# Patient Record
Sex: FEMALE | Race: WHITE | NOT HISPANIC OR LATINO | URBAN - METROPOLITAN AREA
[De-identification: names, ages, dates, MRNs, and addresses within clinical notes are randomized per-mention and may not be internally consistent; named-entity substitution may affect disease eponyms.]

---

## 2023-01-04 ENCOUNTER — APPOINTMENT (OUTPATIENT)
Dept: RADIOLOGY | Facility: IMAGING CENTER | Age: 68
End: 2023-01-04
Attending: STUDENT IN AN ORGANIZED HEALTH CARE EDUCATION/TRAINING PROGRAM
Payer: MEDICARE

## 2023-01-04 ENCOUNTER — OFFICE VISIT (OUTPATIENT)
Dept: URGENT CARE | Facility: CLINIC | Age: 68
End: 2023-01-04
Payer: MEDICARE

## 2023-01-04 VITALS
TEMPERATURE: 98.1 F | DIASTOLIC BLOOD PRESSURE: 72 MMHG | RESPIRATION RATE: 16 BRPM | HEIGHT: 60 IN | SYSTOLIC BLOOD PRESSURE: 100 MMHG | BODY MASS INDEX: 37.3 KG/M2 | OXYGEN SATURATION: 97 % | HEART RATE: 78 BPM | WEIGHT: 190 LBS

## 2023-01-04 DIAGNOSIS — L71.9 ROSACEA: ICD-10-CM

## 2023-01-04 DIAGNOSIS — R05.2 SUBACUTE COUGH: ICD-10-CM

## 2023-01-04 LAB
EXTERNAL QUALITY CONTROL: NORMAL
FLUAV+FLUBV AG SPEC QL IA: NORMAL
INT CON NEG: NORMAL
INT CON NEG: NORMAL
INT CON POS: NORMAL
INT CON POS: NORMAL
SARS-COV+SARS-COV-2 AG RESP QL IA.RAPID: NEGATIVE

## 2023-01-04 PROCEDURE — 71046 X-RAY EXAM CHEST 2 VIEWS: CPT | Mod: TC | Performed by: STUDENT IN AN ORGANIZED HEALTH CARE EDUCATION/TRAINING PROGRAM

## 2023-01-04 PROCEDURE — 99204 OFFICE O/P NEW MOD 45 MIN: CPT | Mod: CS | Performed by: STUDENT IN AN ORGANIZED HEALTH CARE EDUCATION/TRAINING PROGRAM

## 2023-01-04 PROCEDURE — 87426 SARSCOV CORONAVIRUS AG IA: CPT | Performed by: STUDENT IN AN ORGANIZED HEALTH CARE EDUCATION/TRAINING PROGRAM

## 2023-01-04 PROCEDURE — 87804 INFLUENZA ASSAY W/OPTIC: CPT | Performed by: STUDENT IN AN ORGANIZED HEALTH CARE EDUCATION/TRAINING PROGRAM

## 2023-01-04 RX ORDER — SERTRALINE HYDROCHLORIDE 100 MG/1
100 TABLET, FILM COATED ORAL DAILY
COMMUNITY

## 2023-01-04 RX ORDER — ROSUVASTATIN CALCIUM 10 MG/1
10 TABLET, COATED ORAL EVERY EVENING
COMMUNITY

## 2023-01-04 RX ORDER — TRAMADOL HYDROCHLORIDE 50 MG/1
50 TABLET ORAL EVERY 4 HOURS PRN
COMMUNITY
End: 2023-01-06

## 2023-01-04 RX ORDER — BRIMONIDINE 5 MG/G
1 GEL TOPICAL DAILY
Qty: 30 G | Refills: 0 | Status: SHIPPED | OUTPATIENT
Start: 2023-01-04 | End: 2023-01-05

## 2023-01-04 RX ORDER — LISINOPRIL 5 MG/1
5 TABLET ORAL DAILY
COMMUNITY

## 2023-01-04 RX ORDER — LEVOTHYROXINE SODIUM 75 UG/1
75 CAPSULE ORAL
COMMUNITY
End: 2023-01-06

## 2023-01-04 RX ORDER — CYCLOBENZAPRINE HCL 10 MG
10 TABLET ORAL 3 TIMES DAILY PRN
COMMUNITY
End: 2023-01-06

## 2023-01-04 RX ORDER — HYDROCODONE BITARTRATE AND ACETAMINOPHEN 5; 325 MG/1; MG/1
1 TABLET ORAL 2 TIMES DAILY PRN
COMMUNITY

## 2023-01-04 RX ORDER — METOPROLOL TARTRATE AND HYDROCHLOROTHIAZIDE 100; 25 MG/1; MG/1
1 TABLET ORAL DAILY
COMMUNITY
End: 2023-01-06

## 2023-01-04 ASSESSMENT — FIBROSIS 4 INDEX: FIB4 SCORE: 4.46

## 2023-01-04 NOTE — PROGRESS NOTES
"Subjective:   CHIEF COMPLAINT  Chief Complaint   Patient presents with    Cough     Cough, congestion, dizziness x1mo, worsened x2wk ago.       HPI  Trini Tirado is a 67 y.o. female who presents with a chief complaint of a \"nagging\" cough x2 months.  Says there are no specific triggers or aggravating factors.  She has tried using cough drops which have provided limited relief of symptoms.  Cough has been worse at night.  Positive ROS for fever with T-max of 97 degrees, chills and wheezing but no shortness of breath.  Also reports she has been experiencing swelling of her bilateral lower extremities but this is somewhat of a chronic issue.  Also reports has been experiencing a headache and dizziness.  She denies any history of asthma or tobacco abuse.  Says she has had bronchitis in the past and use inhalers.    Also patient complains of redness of her face.  She has a history of rosacea and says current symptoms are consistent with her previous outbreaks.  She has not tried any topical medications for the symptoms.  PMH of lupus.    REVIEW OF SYSTEMS  General: no fever or chills  GI: no nausea or vomiting  See HPI for further details.    PAST MEDICAL HISTORY  There are no problems to display for this patient.      SURGICAL HISTORY  patient denies any surgical history    ALLERGIES  Allergies   Allergen Reactions    Penicillins        CURRENT MEDICATIONS  cyclobenzaprine Tabs  Gabapentin Tabs  HYDROcodone-acetaminophen Tabs  Levothyroxine Sodium Caps  lisinopril Tabs  metoprolol-hydrochlorothiazide  rosuvastatin Tabs  sertraline Tabs  traMADol Tabs    SOCIAL HISTORY  Social History     Tobacco Use    Smoking status: Not on file    Smokeless tobacco: Not on file   Substance and Sexual Activity    Alcohol use: Not on file    Drug use: Not on file    Sexual activity: Not on file       FAMILY HISTORY  No family history on file.       Objective:   PHYSICAL EXAM  VITAL SIGNS: /72   Pulse 78   Temp 36.7 °C (98.1 °F) " (Temporal)   Resp 16   Ht 1.524 m (5')   Wt 86.2 kg (190 lb)   SpO2 97%   BMI 37.11 kg/m²     Gen: no acute distress, normal voice  Skin: dry, intact, moist mucosal membranes.  Mallar facial rash  Eyes: No conjunctival injection bilaterally.  Neck: Normal range of motion. No meningeal signs.   Lungs: CTAB w/ symmetric expansion.  1+ pitting edema bilateral lower extremities.  No calf TTP.  CV: RRR w/o murmurs or clicks  Psych: normal affect, normal judgement, alert, awake      RADIOLOGY RESULTS   DX-CHEST-2 VIEWS    Result Date: 1/4/2023 1/4/2023 3:50 PM HISTORY/REASON FOR EXAM:  Cough TECHNIQUE/EXAM DESCRIPTION AND NUMBER OF VIEWS: Two views of the chest. COMPARISON:  None available. FINDINGS: Cardiomediastinal contour is within normal limits. Mild blunting of RIGHT costophrenic angle. No focal pulmonary consolidation. No pneumothorax. Degenerative change of thoracic spine.     1.  Small RIGHT pleural effusion. 2.  No pneumonia or pneumothorax.             Assessment/Plan:     1. Subacute cough  DX-CHEST-2 VIEWS    PROCALCITONIN    POCT Influenza A/B    POCT SARS-COV Antigen JEOVANNY Manual Result      2. Rosacea  Brimonidine Tartrate 0.33 % Gel      1) chest x-ray demonstrated small right pleural effusion.  Patient was somewhat of a poor historian with regard to past medical history but on review of records it does appear that she does follow with cardiology and I suspect the effusion is likely related to heart failure but no evidence of any acute exacerbation.  Patient was very well-appearing, nontoxic and well-hydrated.  Oxygen saturation 97% and lungs were clear to auscultation bilaterally.  No indication for emergent work-up at this time.  I did order a procalcitonin to rule out underlying bacterial etiology.  Contact the patient with results at [phone] with results.  Okay to leave voicemail.  I did instruct the patient to schedule follow-up appointment with her cardiologist as soon as possible.   Discussed at length red flags and return/ED precautions.  Patient and daughter understood everything discussed today and all questions were answered.    2) chronic issue with acute exacerbation.  Patient is uncertain what medication she has used in the past so I sent a prescription for brimonidine tartrate to her pharmacy.  She was instructed to follow-up with primary care for reevaluation continue management.      Differential diagnosis, natural history, supportive care, and indications for immediate follow-up discussed. All questions answered. Patient agrees with the plan of care.    Follow-up as needed if symptoms worsen or fail to improve to PCP, Urgent care or Emergency Room.      47 minutes was spent on this encounter including reviewing past medical records, providing handout with the location and hours of [facility] for labs, face-to-face time, discussing the diagnosis, reviewing medical management, discussing the need for follow-up, and return/emergency room precautions and charting. This does not include time spent on separately billable procedures/tests.    Please note that this dictation was created using voice recognition software. I have made a reasonable attempt to correct obvious errors, but I expect that there are errors of grammar and possibly content that I did not discover before finalizing the note.

## 2023-01-05 ENCOUNTER — TELEPHONE (OUTPATIENT)
Dept: URGENT CARE | Facility: CLINIC | Age: 68
End: 2023-01-05
Payer: MEDICARE

## 2023-01-05 RX ORDER — METRONIDAZOLE 7.5 MG/G
1 GEL TOPICAL 2 TIMES DAILY
Qty: 45 G | Refills: 0 | Status: SHIPPED | OUTPATIENT
Start: 2023-01-05

## 2023-01-06 ENCOUNTER — APPOINTMENT (OUTPATIENT)
Dept: RADIOLOGY | Facility: MEDICAL CENTER | Age: 68
DRG: 872 | End: 2023-01-06
Attending: EMERGENCY MEDICINE
Payer: MEDICARE

## 2023-01-06 ENCOUNTER — HOSPITAL ENCOUNTER (INPATIENT)
Facility: MEDICAL CENTER | Age: 68
LOS: 2 days | DRG: 872 | End: 2023-01-08
Attending: EMERGENCY MEDICINE | Admitting: STUDENT IN AN ORGANIZED HEALTH CARE EDUCATION/TRAINING PROGRAM
Payer: MEDICARE

## 2023-01-06 DIAGNOSIS — H66.011 NON-RECURRENT ACUTE SUPPURATIVE OTITIS MEDIA OF RIGHT EAR WITH SPONTANEOUS RUPTURE OF TYMPANIC MEMBRANE: ICD-10-CM

## 2023-01-06 DIAGNOSIS — E87.20 NORMAL ANION GAP METABOLIC ACIDOSIS: ICD-10-CM

## 2023-01-06 DIAGNOSIS — H70.91 MASTOIDITIS OF RIGHT SIDE: ICD-10-CM

## 2023-01-06 DIAGNOSIS — H66.011 SPONTANEOUS RUPTURE OF TYMPANIC MEMBRANE OF RIGHT EAR CONCURRENT WITH AND DUE TO ACUTE SUPPURATIVE OTITIS MEDIA: ICD-10-CM

## 2023-01-06 DIAGNOSIS — B37.9 CANDIDIASIS: ICD-10-CM

## 2023-01-06 PROBLEM — D69.6 THROMBOCYTOPENIA (HCC): Status: ACTIVE | Noted: 2023-01-06

## 2023-01-06 PROBLEM — E87.6 HYPOKALEMIA: Status: ACTIVE | Noted: 2023-01-06

## 2023-01-06 PROBLEM — E66.01 CLASS 2 SEVERE OBESITY DUE TO EXCESS CALORIES WITH SERIOUS COMORBIDITY AND BODY MASS INDEX (BMI) OF 37.0 TO 37.9 IN ADULT (HCC): Status: ACTIVE | Noted: 2023-01-06

## 2023-01-06 PROBLEM — E11.9 TYPE II DIABETES MELLITUS (HCC): Status: ACTIVE | Noted: 2023-01-06

## 2023-01-06 PROBLEM — D64.9 NORMOCHROMIC NORMOCYTIC ANEMIA: Status: ACTIVE | Noted: 2023-01-06

## 2023-01-06 PROBLEM — A41.9 SEPSIS (HCC): Status: ACTIVE | Noted: 2023-01-06

## 2023-01-06 PROBLEM — R79.89 ELEVATED SERUM CREATININE: Status: ACTIVE | Noted: 2023-01-06

## 2023-01-06 PROBLEM — H70.009 ACUTE MASTOIDITIS: Status: ACTIVE | Noted: 2023-01-06

## 2023-01-06 LAB
ALBUMIN SERPL BCP-MCNC: 2.9 G/DL (ref 3.2–4.9)
ALBUMIN/GLOB SERPL: 0.9 G/DL
ALP SERPL-CCNC: 237 U/L (ref 30–99)
ALT SERPL-CCNC: 35 U/L (ref 2–50)
ANION GAP SERPL CALC-SCNC: 15 MMOL/L (ref 7–16)
APPEARANCE UR: CLEAR
AST SERPL-CCNC: 108 U/L (ref 12–45)
BACTERIA #/AREA URNS HPF: NEGATIVE /HPF
BASOPHILS # BLD AUTO: 0.7 % (ref 0–1.8)
BASOPHILS # BLD: 0.1 K/UL (ref 0–0.12)
BILIRUB SERPL-MCNC: 2.4 MG/DL (ref 0.1–1.5)
BILIRUB UR QL STRIP.AUTO: ABNORMAL
BUN SERPL-MCNC: 18 MG/DL (ref 8–22)
CALCIUM ALBUM COR SERPL-MCNC: 9.5 MG/DL (ref 8.5–10.5)
CALCIUM SERPL-MCNC: 8.6 MG/DL (ref 8.5–10.5)
CHLORIDE SERPL-SCNC: 102 MMOL/L (ref 96–112)
CO2 SERPL-SCNC: 19 MMOL/L (ref 20–33)
COLOR UR: ABNORMAL
CREAT SERPL-MCNC: 1.28 MG/DL (ref 0.5–1.4)
EOSINOPHIL # BLD AUTO: 0.02 K/UL (ref 0–0.51)
EOSINOPHIL NFR BLD: 0.1 % (ref 0–6.9)
EPI CELLS #/AREA URNS HPF: ABNORMAL /HPF
ERYTHROCYTE [DISTWIDTH] IN BLOOD BY AUTOMATED COUNT: 61.1 FL (ref 35.9–50)
FLUAV RNA SPEC QL NAA+PROBE: NEGATIVE
FLUBV RNA SPEC QL NAA+PROBE: NEGATIVE
GFR SERPLBLD CREATININE-BSD FMLA CKD-EPI: 46 ML/MIN/1.73 M 2
GLOBULIN SER CALC-MCNC: 3.2 G/DL (ref 1.9–3.5)
GLUCOSE BLD STRIP.AUTO-MCNC: 90 MG/DL (ref 65–99)
GLUCOSE BLD STRIP.AUTO-MCNC: 90 MG/DL (ref 65–99)
GLUCOSE SERPL-MCNC: 119 MG/DL (ref 65–99)
GLUCOSE UR STRIP.AUTO-MCNC: NEGATIVE MG/DL
HCT VFR BLD AUTO: 35.6 % (ref 37–47)
HGB BLD-MCNC: 11.7 G/DL (ref 12–16)
HYALINE CASTS #/AREA URNS LPF: ABNORMAL /LPF
IMM GRANULOCYTES # BLD AUTO: 0.14 K/UL (ref 0–0.11)
IMM GRANULOCYTES NFR BLD AUTO: 0.9 % (ref 0–0.9)
KETONES UR STRIP.AUTO-MCNC: ABNORMAL MG/DL
LACTATE SERPL-SCNC: 2.3 MMOL/L (ref 0.5–2)
LACTATE SERPL-SCNC: 2.9 MMOL/L (ref 0.5–2)
LACTATE SERPL-SCNC: 4.7 MMOL/L (ref 0.5–2)
LEUKOCYTE ESTERASE UR QL STRIP.AUTO: ABNORMAL
LYMPHOCYTES # BLD AUTO: 0.6 K/UL (ref 1–4.8)
LYMPHOCYTES NFR BLD: 4 % (ref 22–41)
MAGNESIUM SERPL-MCNC: 1.5 MG/DL (ref 1.5–2.5)
MCH RBC QN AUTO: 29.8 PG (ref 27–33)
MCHC RBC AUTO-ENTMCNC: 32.9 G/DL (ref 33.6–35)
MCV RBC AUTO: 90.8 FL (ref 81.4–97.8)
MICRO URNS: ABNORMAL
MONOCYTES # BLD AUTO: 1.23 K/UL (ref 0–0.85)
MONOCYTES NFR BLD AUTO: 8.1 % (ref 0–13.4)
NEUTROPHILS # BLD AUTO: 13.01 K/UL (ref 2–7.15)
NEUTROPHILS NFR BLD: 86.2 % (ref 44–72)
NITRITE UR QL STRIP.AUTO: POSITIVE
NRBC # BLD AUTO: 0 K/UL
NRBC BLD-RTO: 0 /100 WBC
PH UR STRIP.AUTO: 5.5 [PH] (ref 5–8)
PLATELET # BLD AUTO: 105 K/UL (ref 164–446)
PMV BLD AUTO: 10.8 FL (ref 9–12.9)
POTASSIUM SERPL-SCNC: 3.2 MMOL/L (ref 3.6–5.5)
PROT SERPL-MCNC: 6.1 G/DL (ref 6–8.2)
PROT UR QL STRIP: 30 MG/DL
RBC # BLD AUTO: 3.92 M/UL (ref 4.2–5.4)
RBC # URNS HPF: ABNORMAL /HPF
RBC UR QL AUTO: ABNORMAL
RSV RNA SPEC QL NAA+PROBE: NEGATIVE
SARS-COV-2 RNA RESP QL NAA+PROBE: NOTDETECTED
SODIUM SERPL-SCNC: 136 MMOL/L (ref 135–145)
SP GR UR STRIP.AUTO: 1.02
SPECIMEN SOURCE: NORMAL
UROBILINOGEN UR STRIP.AUTO-MCNC: 2 MG/DL
WBC # BLD AUTO: 15.1 K/UL (ref 4.8–10.8)
WBC #/AREA URNS HPF: ABNORMAL /HPF

## 2023-01-06 PROCEDURE — 36415 COLL VENOUS BLD VENIPUNCTURE: CPT

## 2023-01-06 PROCEDURE — 83735 ASSAY OF MAGNESIUM: CPT

## 2023-01-06 PROCEDURE — C9803 HOPD COVID-19 SPEC COLLECT: HCPCS | Performed by: EMERGENCY MEDICINE

## 2023-01-06 PROCEDURE — A9270 NON-COVERED ITEM OR SERVICE: HCPCS | Performed by: EMERGENCY MEDICINE

## 2023-01-06 PROCEDURE — 770001 HCHG ROOM/CARE - MED/SURG/GYN PRIV*

## 2023-01-06 PROCEDURE — 700105 HCHG RX REV CODE 258: Performed by: STUDENT IN AN ORGANIZED HEALTH CARE EDUCATION/TRAINING PROGRAM

## 2023-01-06 PROCEDURE — 96375 TX/PRO/DX INJ NEW DRUG ADDON: CPT

## 2023-01-06 PROCEDURE — 71045 X-RAY EXAM CHEST 1 VIEW: CPT

## 2023-01-06 PROCEDURE — 83605 ASSAY OF LACTIC ACID: CPT

## 2023-01-06 PROCEDURE — 700105 HCHG RX REV CODE 258: Performed by: EMERGENCY MEDICINE

## 2023-01-06 PROCEDURE — 99222 1ST HOSP IP/OBS MODERATE 55: CPT | Mod: AI | Performed by: STUDENT IN AN ORGANIZED HEALTH CARE EDUCATION/TRAINING PROGRAM

## 2023-01-06 PROCEDURE — A9270 NON-COVERED ITEM OR SERVICE: HCPCS | Performed by: STUDENT IN AN ORGANIZED HEALTH CARE EDUCATION/TRAINING PROGRAM

## 2023-01-06 PROCEDURE — 700111 HCHG RX REV CODE 636 W/ 250 OVERRIDE (IP): Performed by: EMERGENCY MEDICINE

## 2023-01-06 PROCEDURE — 74176 CT ABD & PELVIS W/O CONTRAST: CPT

## 2023-01-06 PROCEDURE — 87070 CULTURE OTHR SPECIMN AEROBIC: CPT

## 2023-01-06 PROCEDURE — 87205 SMEAR GRAM STAIN: CPT

## 2023-01-06 PROCEDURE — 700102 HCHG RX REV CODE 250 W/ 637 OVERRIDE(OP): Performed by: EMERGENCY MEDICINE

## 2023-01-06 PROCEDURE — 96365 THER/PROPH/DIAG IV INF INIT: CPT

## 2023-01-06 PROCEDURE — 81001 URINALYSIS AUTO W/SCOPE: CPT

## 2023-01-06 PROCEDURE — 700111 HCHG RX REV CODE 636 W/ 250 OVERRIDE (IP): Performed by: STUDENT IN AN ORGANIZED HEALTH CARE EDUCATION/TRAINING PROGRAM

## 2023-01-06 PROCEDURE — 76705 ECHO EXAM OF ABDOMEN: CPT

## 2023-01-06 PROCEDURE — 80053 COMPREHEN METABOLIC PANEL: CPT

## 2023-01-06 PROCEDURE — 99285 EMERGENCY DEPT VISIT HI MDM: CPT

## 2023-01-06 PROCEDURE — 87077 CULTURE AEROBIC IDENTIFY: CPT

## 2023-01-06 PROCEDURE — 700102 HCHG RX REV CODE 250 W/ 637 OVERRIDE(OP): Performed by: STUDENT IN AN ORGANIZED HEALTH CARE EDUCATION/TRAINING PROGRAM

## 2023-01-06 PROCEDURE — 87040 BLOOD CULTURE FOR BACTERIA: CPT

## 2023-01-06 PROCEDURE — 700101 HCHG RX REV CODE 250: Performed by: OTOLARYNGOLOGY

## 2023-01-06 PROCEDURE — 82962 GLUCOSE BLOOD TEST: CPT

## 2023-01-06 PROCEDURE — 0241U HCHG SARS-COV-2 COVID-19 NFCT DS RESP RNA 4 TRGT MIC: CPT

## 2023-01-06 PROCEDURE — 70450 CT HEAD/BRAIN W/O DYE: CPT

## 2023-01-06 PROCEDURE — 85025 COMPLETE CBC W/AUTO DIFF WBC: CPT

## 2023-01-06 RX ORDER — POLYETHYLENE GLYCOL 3350 17 G/17G
1 POWDER, FOR SOLUTION ORAL
Status: DISCONTINUED | OUTPATIENT
Start: 2023-01-06 | End: 2023-01-08 | Stop reason: HOSPADM

## 2023-01-06 RX ORDER — METOPROLOL SUCCINATE 100 MG/1
100 TABLET, EXTENDED RELEASE ORAL DAILY
COMMUNITY

## 2023-01-06 RX ORDER — GABAPENTIN 100 MG/1
100 CAPSULE ORAL 2 TIMES DAILY PRN
COMMUNITY

## 2023-01-06 RX ORDER — AMOXICILLIN 250 MG
2 CAPSULE ORAL 2 TIMES DAILY
Status: DISCONTINUED | OUTPATIENT
Start: 2023-01-06 | End: 2023-01-08 | Stop reason: HOSPADM

## 2023-01-06 RX ORDER — ONDANSETRON 2 MG/ML
4 INJECTION INTRAMUSCULAR; INTRAVENOUS ONCE
Status: COMPLETED | OUTPATIENT
Start: 2023-01-06 | End: 2023-01-06

## 2023-01-06 RX ORDER — HEPARIN SODIUM 5000 [USP'U]/ML
5000 INJECTION, SOLUTION INTRAVENOUS; SUBCUTANEOUS EVERY 8 HOURS
Status: DISCONTINUED | OUTPATIENT
Start: 2023-01-06 | End: 2023-01-08 | Stop reason: HOSPADM

## 2023-01-06 RX ORDER — ACETAMINOPHEN 325 MG/1
650 TABLET ORAL EVERY 6 HOURS PRN
Status: DISCONTINUED | OUTPATIENT
Start: 2023-01-06 | End: 2023-01-07

## 2023-01-06 RX ORDER — CIPROFLOXACIN AND DEXAMETHASONE 3; 1 MG/ML; MG/ML
4 SUSPENSION/ DROPS AURICULAR (OTIC) 2 TIMES DAILY
Status: DISCONTINUED | OUTPATIENT
Start: 2023-01-06 | End: 2023-01-08

## 2023-01-06 RX ORDER — ONDANSETRON 2 MG/ML
4 INJECTION INTRAMUSCULAR; INTRAVENOUS EVERY 4 HOURS PRN
Status: DISCONTINUED | OUTPATIENT
Start: 2023-01-06 | End: 2023-01-08 | Stop reason: HOSPADM

## 2023-01-06 RX ORDER — ONDANSETRON 4 MG/1
4 TABLET, ORALLY DISINTEGRATING ORAL EVERY 4 HOURS PRN
Status: DISCONTINUED | OUTPATIENT
Start: 2023-01-06 | End: 2023-01-08 | Stop reason: HOSPADM

## 2023-01-06 RX ORDER — LABETALOL HYDROCHLORIDE 5 MG/ML
10 INJECTION, SOLUTION INTRAVENOUS EVERY 4 HOURS PRN
Status: DISCONTINUED | OUTPATIENT
Start: 2023-01-06 | End: 2023-01-08 | Stop reason: HOSPADM

## 2023-01-06 RX ORDER — POTASSIUM CHLORIDE 20 MEQ/1
40 TABLET, EXTENDED RELEASE ORAL ONCE
Status: COMPLETED | OUTPATIENT
Start: 2023-01-06 | End: 2023-01-06

## 2023-01-06 RX ORDER — LEVOTHYROXINE SODIUM 0.07 MG/1
75 TABLET ORAL
COMMUNITY

## 2023-01-06 RX ORDER — IBUPROFEN 400 MG/1
400 TABLET ORAL ONCE
Status: COMPLETED | OUTPATIENT
Start: 2023-01-07 | End: 2023-01-07

## 2023-01-06 RX ORDER — SODIUM CHLORIDE, SODIUM LACTATE, POTASSIUM CHLORIDE, CALCIUM CHLORIDE 600; 310; 30; 20 MG/100ML; MG/100ML; MG/100ML; MG/100ML
INJECTION, SOLUTION INTRAVENOUS CONTINUOUS
Status: DISCONTINUED | OUTPATIENT
Start: 2023-01-06 | End: 2023-01-07

## 2023-01-06 RX ORDER — MORPHINE SULFATE 4 MG/ML
4 INJECTION INTRAVENOUS ONCE
Status: COMPLETED | OUTPATIENT
Start: 2023-01-06 | End: 2023-01-06

## 2023-01-06 RX ORDER — CEFTRIAXONE 1 G/1
1000 INJECTION, POWDER, FOR SOLUTION INTRAMUSCULAR; INTRAVENOUS ONCE
Status: COMPLETED | OUTPATIENT
Start: 2023-01-06 | End: 2023-01-06

## 2023-01-06 RX ORDER — SODIUM CHLORIDE, SODIUM LACTATE, POTASSIUM CHLORIDE, CALCIUM CHLORIDE 600; 310; 30; 20 MG/100ML; MG/100ML; MG/100ML; MG/100ML
1000 INJECTION, SOLUTION INTRAVENOUS ONCE
Status: COMPLETED | OUTPATIENT
Start: 2023-01-06 | End: 2023-01-06

## 2023-01-06 RX ORDER — BISACODYL 10 MG
10 SUPPOSITORY, RECTAL RECTAL
Status: DISCONTINUED | OUTPATIENT
Start: 2023-01-06 | End: 2023-01-08 | Stop reason: HOSPADM

## 2023-01-06 RX ADMIN — CEFTRIAXONE SODIUM 1000 MG: 1 INJECTION, POWDER, FOR SOLUTION INTRAMUSCULAR; INTRAVENOUS at 11:38

## 2023-01-06 RX ADMIN — SODIUM CHLORIDE, POTASSIUM CHLORIDE, SODIUM LACTATE AND CALCIUM CHLORIDE: 600; 310; 30; 20 INJECTION, SOLUTION INTRAVENOUS at 15:59

## 2023-01-06 RX ADMIN — ACETAMINOPHEN 650 MG: 325 TABLET ORAL at 18:38

## 2023-01-06 RX ADMIN — ONDANSETRON 4 MG: 2 INJECTION INTRAMUSCULAR; INTRAVENOUS at 12:40

## 2023-01-06 RX ADMIN — POTASSIUM CHLORIDE 40 MEQ: 1500 TABLET, EXTENDED RELEASE ORAL at 14:01

## 2023-01-06 RX ADMIN — CEFEPIME 2 G: 2 INJECTION, POWDER, FOR SOLUTION INTRAVENOUS at 15:04

## 2023-01-06 RX ADMIN — POTASSIUM CHLORIDE 40 MEQ: 1500 TABLET, EXTENDED RELEASE ORAL at 18:17

## 2023-01-06 RX ADMIN — CIPROFLOXACIN AND DEXAMETHASONE 4 DROP: 3; 1 SUSPENSION/ DROPS AURICULAR (OTIC) at 18:17

## 2023-01-06 RX ADMIN — SODIUM CHLORIDE, POTASSIUM CHLORIDE, SODIUM LACTATE AND CALCIUM CHLORIDE 1000 ML: 600; 310; 30; 20 INJECTION, SOLUTION INTRAVENOUS at 12:39

## 2023-01-06 RX ADMIN — MORPHINE SULFATE 4 MG: 4 INJECTION INTRAVENOUS at 12:40

## 2023-01-06 RX ADMIN — DOCUSATE SODIUM 50 MG AND SENNOSIDES 8.6 MG 2 TABLET: 8.6; 5 TABLET, FILM COATED ORAL at 18:17

## 2023-01-06 RX ADMIN — HEPARIN SODIUM 5000 UNITS: 5000 INJECTION, SOLUTION INTRAVENOUS; SUBCUTANEOUS at 18:15

## 2023-01-06 ASSESSMENT — LIFESTYLE VARIABLES
TOTAL SCORE: 0
HAVE YOU EVER FELT YOU SHOULD CUT DOWN ON YOUR DRINKING: NO
ALCOHOL_USE: NO
CONSUMPTION TOTAL: NEGATIVE
HOW MANY TIMES IN THE PAST YEAR HAVE YOU HAD 5 OR MORE DRINKS IN A DAY: 0
EVER FELT BAD OR GUILTY ABOUT YOUR DRINKING: NO
HAVE PEOPLE ANNOYED YOU BY CRITICIZING YOUR DRINKING: NO
TOTAL SCORE: 0
AVERAGE NUMBER OF DAYS PER WEEK YOU HAVE A DRINK CONTAINING ALCOHOL: 0
EVER HAD A DRINK FIRST THING IN THE MORNING TO STEADY YOUR NERVES TO GET RID OF A HANGOVER: NO
ON A TYPICAL DAY WHEN YOU DRINK ALCOHOL HOW MANY DRINKS DO YOU HAVE: 0
TOTAL SCORE: 0

## 2023-01-06 ASSESSMENT — FIBROSIS 4 INDEX: FIB4 SCORE: 4.46

## 2023-01-06 ASSESSMENT — PATIENT HEALTH QUESTIONNAIRE - PHQ9
1. LITTLE INTEREST OR PLEASURE IN DOING THINGS: NOT AT ALL
2. FEELING DOWN, DEPRESSED, IRRITABLE, OR HOPELESS: NOT AT ALL
SUM OF ALL RESPONSES TO PHQ9 QUESTIONS 1 AND 2: 0

## 2023-01-06 ASSESSMENT — ENCOUNTER SYMPTOMS
CHILLS: 0
FEVER: 0
BLURRED VISION: 1

## 2023-01-06 NOTE — H&P
Hospital Medicine History & Physical Note    Date of Service  1/6/2023    Primary Care Physician  Pcp Pt States None    Consultants  ENT    Specialist Names: Dr Chevalier    Code Status  Full Code    Chief Complaint  Chief Complaint   Patient presents with    Earache     Right earache, believes the eardrum ruptured last night. Sinus pressure x 1 month    Flu Like Symptoms     Seen at [facility] for same, told she had fluid in her lungs and that it was probably bacterial. Sent for blood work. Not prescribed antibiotics. Prescribed a face cream for facial redness. Unable to fill because medication cost $300.     Leg Swelling     Saw cardiologist Dr. Andrea In November.     Fall     Falls x the last year, using a walker. Referral to neurology from November.        History of Presenting Illness  Trini Tirado is a 67 y.o. female who presented 1/6/2023 with a ear pain.  Patient reports that she has been having headaches and sinus pressure along with dizziness and the sensation of spinning.  She reports her symptoms started last week with ear pain on the right side and decreased hearing.  She reports that she noticed drainage starting yesterday of the right ear.  Denies any problems with her left ear.  She denies fevers, chills, shortness of breath, chest pain, nausea, vomiting.  In the ED CT head demonstrated fluid signal within the mastoid without erosion or subperiosteal abscess.  ENT was consulted.    I discussed the plan of care with patient.    Review of Systems  Review of Systems   Constitutional:  Positive for malaise/fatigue. Negative for chills and fever.   HENT:  Positive for ear pain and hearing loss.    Eyes:  Positive for blurred vision.   All other systems reviewed and are negative.    Past Medical History   has no past medical history on file.    Surgical History   has no past surgical history on file.     Family History     Family history reviewed with patient. There is no family history that is pertinent  to the chief complaint.     Social History   reports that she has never smoked. She has never used smokeless tobacco. She reports that she does not currently use alcohol. She reports that she does not use drugs.    Allergies  Allergies   Allergen Reactions    Penicillins Rash     Rash       Medications  Prior to Admission Medications   Prescriptions Last Dose Informant Patient Reported? Taking?   HYDROcodone-acetaminophen (NORCO) 5-325 MG Tab per tablet 1/6/2023 at 0600 Patient Yes No   Sig: Take 1 Tablet by mouth 2 times a day as needed (Pain).   gabapentin (NEURONTIN) 100 MG Cap 4-5 DAYS AGO at [facility] Patient Yes Yes   Sig: Take 100 mg by mouth 2 times a day as needed (Leg spasms).   levothyroxine (SYNTHROID) 75 MCG Tab 1/3/2023 at [facility] Patient Yes Yes   Sig: Take 75 mcg by mouth every morning on an empty stomach.   lisinopril (PRINIVIL) 5 MG Tab 1/3/2023 at [facility] Patient Yes No   Sig: Take 5 mg by mouth every day.   metoprolol SR (TOPROL XL) 100 MG TABLET SR 24 HR 1/3/2023 at [facility] Patient Yes Yes   Sig: Take 100 mg by mouth every day.   metronidazole (METROGEL) 0.75 % gel NOT YET STARTED at NOT YET STARTED Patient No No   Sig: Apply 1 Application topically 2 times a day.   rosuvastatin (CRESTOR) 10 MG Tab 1/3/2023 at [facility] Patient Yes No   Sig: Take 10 mg by mouth every evening.   sertraline (ZOLOFT) 100 MG Tab 1/3/2023 at [facility] Patient Yes No   Sig: Take 100 mg by mouth every day.      Facility-Administered Medications: None       Physical Exam  Temp:  [36.1 °C (97 °F)-37 °C (98.6 °F)] 36.1 °C (97 °F)  Pulse:  [] 98  Resp:  [15-20] 18  BP: (107-151)/(45-80) 107/45  SpO2:  [95 %-100 %] 96 %  Blood Pressure : 132/62   Temperature: 37 °C (98.6 °F)   Pulse: 98   Respiration: 18   Pulse Oximetry: 95 %       Physical Exam  Constitutional:       Appearance: Normal appearance.   HENT:      Ears:      Comments: Mucoid purulent discharge in the right ear canal.  Right tympanic membrane appears perforated.  Left tympanic  membrane cloudy  Eyes:      General: No scleral icterus.        Right eye: No discharge.         Left eye: No discharge.   Cardiovascular:      Rate and Rhythm: Normal rate and regular rhythm.      Heart sounds: Normal heart sounds.   Pulmonary:      Effort: No respiratory distress.      Breath sounds: No wheezing.   Abdominal:      General: There is no distension.      Tenderness: There is no abdominal tenderness. There is no guarding.   Skin:     General: Skin is warm and dry.      Coloration: Skin is not jaundiced.   Neurological:      Mental Status: She is alert.       Laboratory:  Recent Labs     01/06/23  1115   WBC 15.1*   RBC 3.92*   HEMOGLOBIN 11.7*   HEMATOCRIT 35.6*   MCV 90.8   MCH 29.8   MCHC 32.9*   RDW 61.1*   PLATELETCT 105*   MPV 10.8     Recent Labs     01/06/23  1115   SODIUM 136   POTASSIUM 3.2*   CHLORIDE 102   CO2 19*   GLUCOSE 119*   BUN 18   CREATININE 1.28   CALCIUM 8.6     Recent Labs     01/06/23  1115   ALTSGPT 35   ASTSGOT 108*   ALKPHOSPHAT 237*   TBILIRUBIN 2.4*   GLUCOSE 119*         No results for input(s): NTPROBNP in the last 72 hours.      No results for input(s): TROPONINT in the last 72 hours.    Imaging:  CT-RENAL COLIC EVALUATION(A/P W/O)   Final Result      1.  Small right pleural effusion.      2.  Small amount of ascites in the abdomen.      3.  Hepatosplenomegaly with cirrhotic changes of the liver.      4.  Multiple small nonobstructing right-sided renal calculi.      5.  No evidence of hydronephrosis or ureteral obstruction.      US-RUQ   Final Result      1.  Features of chronic liver disease with stigmata of portal hypertension.   2.  Ascites.   3.  Portal vein thrombus cannot be definitively excluded given lack of color Doppler flow, though this could be related to slow flow related to portal hypertension.      CT-HEAD W/O   Final Result      1.  Opacification of the right mastoid air cells and middle ear consistent with reported symptoms of otomastoiditis. No  evidence to suggest erosive otomastoiditis. The ossicles appear intact.   2.  No acute intracranial abnormality.   3.  Mild white matter changes, likely microvascular ischemic changes.         DX-CHEST-PORTABLE (1 VIEW)   Final Result      1.  No acute cardiac or pulmonary abnormalities are identified.              Assessment/Plan:  Justification for Admission Status  I anticipate this patient will require at least two midnights for appropriate medical management, necessitating inpatient admission because sepsis    Patient will need a Med/Surg bed on MEDICAL service .  The need is secondary to sepsis.    * Sepsis ([facility])- (present on admission)  Assessment & Plan  This is Sepsis Present on admission  SIRS criteria identified on my evaluation include: Tachycardia, with heart rate greater than 90 BPM and Leukocytosis, with WBC greater than 12,000  Source is otomastoiditis  Sepsis protocol initiated  Fluid resuscitation ordered per protocol  Crystalloid Fluid Administration: Fluid resuscitation ordered per standard protocol - 30 mL/kg per current or ideal body weight  IV antibiotics as appropriate for source of sepsis  Reassessment: I have reassessed the patient's hemodynamic status  Cefepime        Acute mastoiditis  Assessment & Plan  Acute mastoiditis on the right.  Ciprodex  Cefepime    Thrombocytopenia ([facility])  Assessment & Plan  Plt 108  No active bleeding  monitor    Normochromic normocytic anemia  Assessment & Plan  hgb 11.7  No active bleeding.    Hypokalemia  Assessment & Plan  K 3.2  KCL    Normal anion gap metabolic acidosis  Assessment & Plan  Elevated lactic, decreased bicarb and normal GAP  IV fluids  trend    Elevated serum creatinine  Assessment & Plan  Suspect FABY. GFR 46  IV fluids  Bmp in am    Class 2 severe obesity due to excess calories with serious comorbidity and body mass index (BMI) of 37.0 to 37.9 in adult ([facility])  Assessment & Plan  Diet exercise and weight loss    Type II diabetes mellitus  (HCC)  Assessment & Plan  a1c 6.9  ISS  DM diet        VTE prophylaxis: enoxaparin ppx

## 2023-01-06 NOTE — ED NOTES
Suman from Lab called with critical result of Lactic Acid 4.7 at 1226. Critical lab result read back to Suman.   Dr. Cazares notified of critical lab result at 1226.  Critical lab result read back by Dr. Cazares.

## 2023-01-06 NOTE — ASSESSMENT & PLAN NOTE
This is Sepsis Present on admission  SIRS criteria identified on my evaluation include: Tachycardia, with heart rate greater than 90 BPM and Leukocytosis, with WBC greater than 12,000  Source is otomastoiditis  Sepsis protocol initiated  Fluid resuscitation ordered per protocol  Crystalloid Fluid Administration: Fluid resuscitation ordered per standard protocol - 30 mL/kg per current or ideal body weight  IV antibiotics as appropriate for source of sepsis  Reassessment: I have reassessed the patient's hemodynamic status  Cefepime  Resolved

## 2023-01-06 NOTE — ED TRIAGE NOTES
Trini Tirado  67 y.o. female  Chief Complaint   Patient presents with    Earache     Right earache, believes the eardrum ruptured last night. Sinus pressure x 1 month    Flu Like Symptoms     Seen at [facility] for same, told she had fluid in her lungs and that it was probably bacterial. Sent for blood work. Not prescribed antibiotics. Prescribed a face cream for facial redness. Unable to fill because medication cost $300.     Leg Swelling     Saw cardiologist Dr. Andrea In November.     Fall     Falls x the last year, using a walker. Referral to neurology from November.        Pt ambulatory to triage with steady gait using walker for above complaint.     Pt is GCS 15, speaking in full sentences, follows commands and responds appropriately to questions. Resp are even and unlabored.     Pt placed in lobby. Pt educated on triage process. Pt encouraged to alert staff for any changes.     This RN masked and in appropriate PPE during encounter.     Vitals:    01/06/23 0935   BP: 114/77   Pulse: 93   Resp: 20   Temp: 37 °C (98.6 °F)   SpO2: 98%

## 2023-01-06 NOTE — ED NOTES
This RN called to pt's room by pt's daughter due to concern of fluid leaking from pt's right ear.  Seroussanginous fluid noted to drain from right ear. Wash clothes in place under ear. Dr. Cazares paged to update.

## 2023-01-06 NOTE — ED PROVIDER NOTES
ER Provider Note    Scribed for Elo Cazares M.d. by Joey Caro. 1/6/2023  10:20 AM    Primary Care Provider: No primary care provider.    CHIEF COMPLAINT  Chief Complaint   Patient presents with    Earache     Right earache, believes the eardrum ruptured last night. Sinus pressure x 1 month    Flu Like Symptoms     Seen at [facility] for same, told she had fluid in her lungs and that it was probably bacterial. Sent for blood work. Not prescribed antibiotics. Prescribed a face cream for facial redness. Unable to fill because medication cost $300.     Leg Swelling     Saw cardiologist Dr. Andrea In November.     Fall     Falls x the last year, using a walker. Referral to neurology from November.      EXTERNAL RECORDS REVIEWED  Select: Other None    HPI/ROS  LIMITATION TO HISTORY   Select: : None  OUTSIDE HISTORIAN(S):  Select: Family (Daughter)    Trini Tirado is a 67 y.o. female who presents to the ED for evaluation of right ear pain onset last night. She thinks her eardrum ruptured, because she heard a pop in the ear. She notes that she has been feeling ill since November. She has been experiencing sharp intermittent sinus pain. She has associated fatigue, weight loss, cough, nausea, headaches, falls, and leg swelling. Her falls have been occurring since January 2022 with the last fall being 2 weeks ago. She also mentions that she has chronic back pain, and it has not increased or worsened. She denies any vomiting, diarrhea, chest pain, or dysuria. No alleviating or exacerbating factors noted. On Wednesday, she presented to urgent care for the same symptoms. They were concerned for a bacterial lung infection. They performed a chest x-ray and blood work. They did not prescribe antibiotics. They prescribed a topical cream, but she was unable to fill the prescription because it was too expensive.    Pertinent positives include ear pain, sinus pain, fatigue, weight loss, cough, nausea, headaches, falls, and  leg swelling. Pertinent negatives include no vomiting, diarrhea, chest pain, or dysuria.  All other systems reviewed and negative. See HPI for further details.    PAST MEDICAL HISTORY  History reviewed. No pertinent past medical history.    SURGICAL HISTORY  History reviewed. No pertinent surgical history.    FAMILY HISTORY  History reviewed. No pertinent family history.    SOCIAL HISTORY   reports that she has never smoked. She has never used smokeless tobacco. She reports that she does not currently use alcohol. She reports that she does not use drugs.    CURRENT MEDICATIONS  Current Discharge Medication List        CONTINUE these medications which have NOT CHANGED    Details   gabapentin (NEURONTIN) 100 MG Cap Take 100 mg by mouth 2 times a day as needed (Leg spasms).      levothyroxine (SYNTHROID) 75 MCG Tab Take 75 mcg by mouth every morning on an empty stomach.      metoprolol SR (TOPROL XL) 100 MG TABLET SR 24 HR Take 100 mg by mouth every day.      metronidazole (METROGEL) 0.75 % gel Apply 1 Application topically 2 times a day.  Qty: 45 g, Refills: 0    Associated Diagnoses: Rosacea      lisinopril (PRINIVIL) 5 MG Tab Take 5 mg by mouth every day.      rosuvastatin (CRESTOR) 10 MG Tab Take 10 mg by mouth every evening.      sertraline (ZOLOFT) 100 MG Tab Take 100 mg by mouth every day.      HYDROcodone-acetaminophen (NORCO) 5-325 MG Tab per tablet Take 1 Tablet by mouth 2 times a day as needed (Pain).             ALLERGIES  Penicillins    PHYSICAL EXAM  /77   Pulse 93   Temp 37 °C (98.6 °F) (Temporal)   Resp 20   Ht 1.524 m (5')   Wt 87.3 kg (192 lb 7.4 oz)   SpO2 98%   BMI 37.59 kg/m²   Constitutional: Well developed, well nourished; No acute distress. Elevated BMI.  HENT: Normocephalic, Atraumatic, Bilateral external ears normal, No erythema or exudates in posterior oropharynx. Right TM is erythematous, and bulging. Tenderness over the right mastoid. Dry mucous membranes. Erythema in the  posterior oropharynx. Dried nasal mucosa with dry mucous but nothing particularly purulent.  Eyes: PERRL, EOMI, Conjunctiva normal, No discharge.   Neck: Normal range of motion, supple, nontender  Lymphatic: No lymphadenopathy noted.   Cardiovascular: Normal heart rate, Normal rhythm, No murmurs, rubs or gallops   Thorax & Lungs: Decreased breath sounds throughout. No respiratory distress, No wheezing rales, or rhonchi; No chest tenderness. No crepitus or subQ air  Abdomen: Inconsistent tenderness in the upper abdomen, no guarding no rebound, no masses, no pulsatile mass, no tenderness, no distention  Skin: Warm, Dry, No erythema, No rash.   Back: No tenderness, Mild left CVA tenderness.  Extremities: 2+ dp and pt pulses bilateral LEs;  Nontender; no pretibial edema. No drift of the lower extremities. Lower extremity strengths are 5/5 and equal bilaterally by testing of dorsiflexors and plantarflexors.  Neurologic: Alert & oriented x 4, Normal motor function, Normal sensory function.  Psychiatric: appropriate, normal affect    DIAGNOSTIC STUDIES    Labs:   Results for orders placed or performed during the hospital encounter of 01/06/23   CBC WITH DIFFERENTIAL   Result Value Ref Range    WBC 15.1 (H) 4.8 - 10.8 K/uL    RBC 3.92 (L) 4.20 - 5.40 M/uL    Hemoglobin 11.7 (L) 12.0 - 16.0 g/dL    Hematocrit 35.6 (L) 37.0 - 47.0 %    MCV 90.8 81.4 - 97.8 fL    MCH 29.8 27.0 - 33.0 pg    MCHC 32.9 (L) 33.6 - 35.0 g/dL    RDW 61.1 (H) 35.9 - 50.0 fL    Platelet Count 105 (L) 164 - 446 K/uL    MPV 10.8 9.0 - 12.9 fL    Neutrophils-Polys 86.20 (H) 44.00 - 72.00 %    Lymphocytes 4.00 (L) 22.00 - 41.00 %    Monocytes 8.10 0.00 - 13.40 %    Eosinophils 0.10 0.00 - 6.90 %    Basophils 0.70 0.00 - 1.80 %    Immature Granulocytes 0.90 0.00 - 0.90 %    Nucleated RBC 0.00 /100 WBC    Neutrophils (Absolute) 13.01 (H) 2.00 - 7.15 K/uL    Lymphs (Absolute) 0.60 (L) 1.00 - 4.80 K/uL    Monos (Absolute) 1.23 (H) 0.00 - 0.85 K/uL    Eos  (Absolute) 0.02 0.00 - 0.51 K/uL    Baso (Absolute) 0.10 0.00 - 0.12 K/uL    Immature Granulocytes (abs) 0.14 (H) 0.00 - 0.11 K/uL    NRBC (Absolute) 0.00 K/uL   COMP METABOLIC PANEL   Result Value Ref Range    Sodium 136 135 - 145 mmol/L    Potassium 3.2 (L) 3.6 - 5.5 mmol/L    Chloride 102 96 - 112 mmol/L    Co2 19 (L) 20 - 33 mmol/L    Anion Gap 15.0 7.0 - 16.0    Glucose 119 (H) 65 - 99 mg/dL    Bun 18 8 - 22 mg/dL    Creatinine 1.28 0.50 - 1.40 mg/dL    Calcium 8.6 8.5 - 10.5 mg/dL    AST(SGOT) 108 (H) 12 - 45 U/L    ALT(SGPT) 35 2 - 50 U/L    Alkaline Phosphatase 237 (H) 30 - 99 U/L    Total Bilirubin 2.4 (H) 0.1 - 1.5 mg/dL    Albumin 2.9 (L) 3.2 - 4.9 g/dL    Total Protein 6.1 6.0 - 8.2 g/dL    Globulin 3.2 1.9 - 3.5 g/dL    A-G Ratio 0.9 g/dL   LACTIC ACID   Result Value Ref Range    Lactic Acid 4.7 (HH) 0.5 - 2.0 mmol/L   URINALYSIS (UA)    Specimen: Urine   Result Value Ref Range    Color DK Yellow     Character Clear     Specific Gravity 1.022 <1.035    Ph 5.5 5.0 - 8.0    Glucose Negative Negative mg/dL    Ketones Trace (A) Negative mg/dL    Protein 30 (A) Negative mg/dL    Bilirubin Small (A) Negative    Urobilinogen, Urine 2.0 Negative    Nitrite Positive (A) Negative    Leukocyte Esterase Moderate (A) Negative    Occult Blood Moderate (A) Negative    Micro Urine Req Microscopic    CoV-2, FLU A/B, and RSV by PCR (2-4 Hours CEPHEID) : Collect NP swab in VTM    Specimen: Respirate   Result Value Ref Range    Influenza virus A RNA Negative Negative    Influenza virus B, PCR Negative Negative    RSV, PCR Negative Negative    SARS-CoV-2 by PCR NotDetected     SARS-CoV-2 Source NP Swab    URINE MICROSCOPIC (W/UA)   Result Value Ref Range    WBC 10-20 (A) /hpf    RBC 5-10 (A) /hpf    Bacteria Negative None /hpf    Epithelial Cells Few /hpf    Hyaline Cast 11-20 (A) /lpf   CORRECTED CALCIUM   Result Value Ref Range    Correct Calcium 9.5 8.5 - 10.5 mg/dL   ESTIMATED GFR   Result Value Ref Range    GFR  (CKD-EPI) 46 (A) >60 mL/min/1.73 m 2   MAGNESIUM   Result Value Ref Range    Magnesium 1.5 1.5 - 2.5 mg/dL   Lactic Acid   Result Value Ref Range    Lactic Acid 2.9 (H) 0.5 - 2.0 mmol/L   Lactic Acid   Result Value Ref Range    Lactic Acid 2.3 (H) 0.5 - 2.0 mmol/L   POCT glucose device results   Result Value Ref Range    POC Glucose, Blood 90 65 - 99 mg/dL        Radiology:   The attending emergency physician has independently interpreted the diagnostic imaging associated with this visit and am waiting the final reading from the radiologist.   CT-RENAL COLIC EVALUATION(A/P W/O)   Final Result      1.  Small right pleural effusion.      2.  Small amount of ascites in the abdomen.      3.  Hepatosplenomegaly with cirrhotic changes of the liver.      4.  Multiple small nonobstructing right-sided renal calculi.      5.  No evidence of hydronephrosis or ureteral obstruction.      US-RUQ   Final Result      1.  Features of chronic liver disease with stigmata of portal hypertension.   2.  Ascites.   3.  Portal vein thrombus cannot be definitively excluded given lack of color Doppler flow, though this could be related to slow flow related to portal hypertension.      CT-HEAD W/O   Final Result      1.  Opacification of the right mastoid air cells and middle ear consistent with reported symptoms of otomastoiditis. No evidence to suggest erosive otomastoiditis. The ossicles appear intact.   2.  No acute intracranial abnormality.   3.  Mild white matter changes, likely microvascular ischemic changes.         DX-CHEST-PORTABLE (1 VIEW)   Final Result      1.  No acute cardiac or pulmonary abnormalities are identified.            COURSE & MEDICAL DECISION MAKING     ED Observation Status? No; Patient does not meet criteria for ED Observation.     INITIAL ASSESSMENT AND PLAN  Care Narrative: Patient presents to the ER with multiple complaints.  Her primary complaint is left ear pain which started last night.  However, she says  "she has been dealing with sinus issues since before Thanksgiving of 2022.  Patient felt a pop in her right ear last night and the pain got worse.  She is been having intermittent headaches although no headache right now.  No fevers.  No stiff neck.  No signs of meningitis.  She does have a bulging and erythematous right tympanic membrane.  Nursing staff reported throughout the ED stay that right ear is now draining a purulent material.  The patient had some tenderness behind the right ear in the mastoid region.  I did a CT scan of the brain and there is what looks like a mastoiditis on the right.  No other abnormalities.  Patient also complains of associated weight loss of 50 pounds over the last year.  She complains of intermittent cough.  She feels very fatigued.  Daughter reports that she is fallen multiple times since January 2022.  Her last fall was 2 weeks ago.  Patient states her leg just \"gives out on her\" and this is been going on for over a year now.  Nothing new or different in that respect.  The patient has good strength of bilateral lower extremities I do not think she has any focal weakness of her legs which is contributing to her falls.  She has been using a walker lately and that seems to be helping.  Patient was also found to have urinary tract infection.  Her LFTs were elevated so I did a right upper quadrant ultrasound when she was found to have findings consistent with chronic liver disease and ascites.  Since she had a UTI did a CT scan of the abdomen pelvis to be sure she did not have an infected ureteral stone.  No evidence of infected ureteral stone.  Additionally, patient has candidiasis under her pannus.  This appears to be longstanding and chronic.  We will, however, need to be treated.  Patient was given a dose of Rocephin immediately upon identifying her right otitis media.  This will also cover for UTI.  Once we discovered looks like a mastoiditis on CT scan, I spoke with pharmacy and " we decided to use cefepime to get some Pseudomonas coverage.  I spoke with ear nose and throat surgery on-call.  She request that we culture the patient's drainage from her ear and send it to the lab.  She also would like the patient on Ciprodex drops as well as IV antibiotics.  She will see the patient in consultation but feels she will get better with antibiotics.  At this time patient needs to be hospitalized for UTI, mastoiditis, right otitis media,  sepsis and generalized weakness.  I spoke with Dr. Aggarwal, hospitalist on-call, and he will kindly evaluate the patient for hospitalization.    10:37 AM - Patient seen and examined at bedside. Patient will be treated with Rocephin 2,000 mg.  Discussed my plan of care with the patient and patient's daughter, including labs and imaging. They are understandable and agreeable with the plan of care.    12:15 PM - Paged Hospitalist.    1530: Discussed with Dr. France hospitalist on-call.  He will kindly evaluate the patient for hospitalization.    1540: Discussed with Dr. Montoya, ear nose and throat surgeon on-call.  She would like us to culture the purulent material in the external auditory canal.  She recommends Ciprodex otic drops as well as IV antibiotics and said that since patient is already draining from the external auditory canal, she is already taking care of the problem on her own and believes she will get better on IV antibiotics and likely will not need operative intervention.  She will see the patient in consultation.    ADDITIONAL PROBLEM LIST AND DISPOSITION    Problem #1: Acute right otitis media  Problem #2: Acute right mastoiditis  Problem #3: Acute UTI  Problem #4: Candidiasis under pannus  Problem #5:    I have discussed management of the patient with the following physicians and ALON's: I spoke with hospitalist as well as ENT on-call.    Discussion of management with other [facility] or appropriate source(s): Select: Pharmacy I spoke with Zaira  pharmacist.  We decided to use cefepime to cover for Pseudomonas.      Escalation of care considered, and ultimately not performed: IV fluids.    Barriers to care at this time, including but not limited to: Select: Daughter reports that patient does not convey her illnesses and medical problems to her .     Decision tools and prescription drugs considered including, but not limited to: Select: None .    HYDRATION: Based on the patient's presentation of Sepsis the patient was given IV fluids. IV Hydration was used because oral hydration was not adequate alone. Upon recheck following hydration, the patient was improved.    Patient will be hospitalized by Dr. Aggarwal in guarded condition.    FINAL DIANGOSIS  1. Non-recurrent acute suppurative otitis media of right ear with spontaneous rupture of tympanic membrane Acute   2. Mastoiditis of right side Acute   3. Candidiasis Acute          The note accurately reflects work and decisions made by me.  Elo Cazares M.D.  1/6/2023  7:19 PM    This dictation has been created using voice recognition software. The accuracy of the dictation is limited by the abilities of the software. I expect there may be some errors of grammar and possibly content. I made every attempt to manually correct the errors within my dictation. However, errors related to voice recognition software may still exist and should be interpreted within the appropriate context.     IJoey (Orlandoibe), am scribing for, and in the presence of, Elo Cazares M.D..    Electronically signed by: Joey Caro (Bhumi), 1/6/2023    Elo ADAMS M.D. personally performed the services described in this documentation, as scribed by Joey Caro in my presence, and it is both accurate and complete.    C

## 2023-01-06 NOTE — ED NOTES
PT was able to walk up to 72 PUR with her own walker. Warm blanket provided, and she was hooked up to the monitor.

## 2023-01-07 PROBLEM — H66.011: Status: ACTIVE | Noted: 2023-01-07

## 2023-01-07 LAB
ANION GAP SERPL CALC-SCNC: 10 MMOL/L (ref 7–16)
BUN SERPL-MCNC: 20 MG/DL (ref 8–22)
CALCIUM SERPL-MCNC: 8.3 MG/DL (ref 8.5–10.5)
CHLORIDE SERPL-SCNC: 106 MMOL/L (ref 96–112)
CO2 SERPL-SCNC: 22 MMOL/L (ref 20–33)
CREAT SERPL-MCNC: 1.31 MG/DL (ref 0.5–1.4)
ERYTHROCYTE [DISTWIDTH] IN BLOOD BY AUTOMATED COUNT: 63 FL (ref 35.9–50)
GFR SERPLBLD CREATININE-BSD FMLA CKD-EPI: 45 ML/MIN/1.73 M 2
GLUCOSE BLD STRIP.AUTO-MCNC: 63 MG/DL (ref 65–99)
GLUCOSE BLD STRIP.AUTO-MCNC: 69 MG/DL (ref 65–99)
GLUCOSE BLD STRIP.AUTO-MCNC: 78 MG/DL (ref 65–99)
GLUCOSE SERPL-MCNC: 65 MG/DL (ref 65–99)
GRAM STN SPEC: NORMAL
HCT VFR BLD AUTO: 34.2 % (ref 37–47)
HGB BLD-MCNC: 11.2 G/DL (ref 12–16)
MCH RBC QN AUTO: 29.7 PG (ref 27–33)
MCHC RBC AUTO-ENTMCNC: 32.7 G/DL (ref 33.6–35)
MCV RBC AUTO: 90.7 FL (ref 81.4–97.8)
PHOSPHATE SERPL-MCNC: 2.5 MG/DL (ref 2.5–4.5)
PLATELET # BLD AUTO: 110 K/UL (ref 164–446)
PMV BLD AUTO: 10.4 FL (ref 9–12.9)
POTASSIUM SERPL-SCNC: 4.1 MMOL/L (ref 3.6–5.5)
RBC # BLD AUTO: 3.77 M/UL (ref 4.2–5.4)
SIGNIFICANT IND 70042: NORMAL
SITE SITE: NORMAL
SODIUM SERPL-SCNC: 138 MMOL/L (ref 135–145)
SOURCE SOURCE: NORMAL
T4 FREE SERPL-MCNC: 1.2 NG/DL (ref 0.93–1.7)
TSH SERPL DL<=0.005 MIU/L-ACNC: 7.75 UIU/ML (ref 0.38–5.33)
WBC # BLD AUTO: 11.4 K/UL (ref 4.8–10.8)

## 2023-01-07 PROCEDURE — A9270 NON-COVERED ITEM OR SERVICE: HCPCS | Performed by: STUDENT IN AN ORGANIZED HEALTH CARE EDUCATION/TRAINING PROGRAM

## 2023-01-07 PROCEDURE — 84100 ASSAY OF PHOSPHORUS: CPT

## 2023-01-07 PROCEDURE — 84439 ASSAY OF FREE THYROXINE: CPT

## 2023-01-07 PROCEDURE — 700105 HCHG RX REV CODE 258: Performed by: STUDENT IN AN ORGANIZED HEALTH CARE EDUCATION/TRAINING PROGRAM

## 2023-01-07 PROCEDURE — 82962 GLUCOSE BLOOD TEST: CPT | Mod: 91

## 2023-01-07 PROCEDURE — 700102 HCHG RX REV CODE 250 W/ 637 OVERRIDE(OP): Performed by: STUDENT IN AN ORGANIZED HEALTH CARE EDUCATION/TRAINING PROGRAM

## 2023-01-07 PROCEDURE — 84443 ASSAY THYROID STIM HORMONE: CPT

## 2023-01-07 PROCEDURE — 85027 COMPLETE CBC AUTOMATED: CPT

## 2023-01-07 PROCEDURE — 83970 ASSAY OF PARATHORMONE: CPT

## 2023-01-07 PROCEDURE — 99233 SBSQ HOSP IP/OBS HIGH 50: CPT | Performed by: STUDENT IN AN ORGANIZED HEALTH CARE EDUCATION/TRAINING PROGRAM

## 2023-01-07 PROCEDURE — 700111 HCHG RX REV CODE 636 W/ 250 OVERRIDE (IP): Performed by: STUDENT IN AN ORGANIZED HEALTH CARE EDUCATION/TRAINING PROGRAM

## 2023-01-07 PROCEDURE — 80048 BASIC METABOLIC PNL TOTAL CA: CPT

## 2023-01-07 PROCEDURE — 770001 HCHG ROOM/CARE - MED/SURG/GYN PRIV*

## 2023-01-07 PROCEDURE — 700102 HCHG RX REV CODE 250 W/ 637 OVERRIDE(OP): Performed by: NURSE PRACTITIONER

## 2023-01-07 PROCEDURE — A9270 NON-COVERED ITEM OR SERVICE: HCPCS | Performed by: NURSE PRACTITIONER

## 2023-01-07 PROCEDURE — 700102 HCHG RX REV CODE 250 W/ 637 OVERRIDE(OP)

## 2023-01-07 PROCEDURE — 87799 DETECT AGENT NOS DNA QUANT: CPT

## 2023-01-07 PROCEDURE — A9270 NON-COVERED ITEM OR SERVICE: HCPCS

## 2023-01-07 PROCEDURE — 36415 COLL VENOUS BLD VENIPUNCTURE: CPT

## 2023-01-07 RX ORDER — ACETAMINOPHEN 500 MG
1000 TABLET ORAL 3 TIMES DAILY
Status: DISCONTINUED | OUTPATIENT
Start: 2023-01-07 | End: 2023-01-08 | Stop reason: HOSPADM

## 2023-01-07 RX ORDER — DIPHENHYDRAMINE HCL 25 MG
25 TABLET ORAL ONCE
Status: COMPLETED | OUTPATIENT
Start: 2023-01-07 | End: 2023-01-07

## 2023-01-07 RX ORDER — OXYCODONE HYDROCHLORIDE 5 MG/1
5 TABLET ORAL EVERY 4 HOURS PRN
Status: DISCONTINUED | OUTPATIENT
Start: 2023-01-07 | End: 2023-01-08 | Stop reason: HOSPADM

## 2023-01-07 RX ORDER — NYSTATIN 100000 [USP'U]/G
POWDER TOPICAL 2 TIMES DAILY
Status: DISCONTINUED | OUTPATIENT
Start: 2023-01-07 | End: 2023-01-08 | Stop reason: HOSPADM

## 2023-01-07 RX ADMIN — HEPARIN SODIUM 5000 UNITS: 5000 INJECTION, SOLUTION INTRAVENOUS; SUBCUTANEOUS at 08:22

## 2023-01-07 RX ADMIN — CEFEPIME 2 G: 2 INJECTION, POWDER, FOR SOLUTION INTRAVENOUS at 10:11

## 2023-01-07 RX ADMIN — NYSTATIN: 100000 POWDER TOPICAL at 17:25

## 2023-01-07 RX ADMIN — ACETAMINOPHEN 650 MG: 325 TABLET ORAL at 10:53

## 2023-01-07 RX ADMIN — IBUPROFEN 400 MG: 400 TABLET, FILM COATED ORAL at 00:05

## 2023-01-07 RX ADMIN — CEFEPIME 2 G: 2 INJECTION, POWDER, FOR SOLUTION INTRAVENOUS at 17:35

## 2023-01-07 RX ADMIN — DIPHENHYDRAMINE HYDROCHLORIDE 25 MG: 25 TABLET ORAL at 20:28

## 2023-01-07 RX ADMIN — ACETAMINOPHEN 1000 MG: 500 TABLET ORAL at 16:18

## 2023-01-07 RX ADMIN — CIPROFLOXACIN AND DEXAMETHASONE 4 DROP: 3; 1 SUSPENSION/ DROPS AURICULAR (OTIC) at 04:11

## 2023-01-07 RX ADMIN — OXYCODONE HYDROCHLORIDE 5 MG: 5 TABLET ORAL at 13:51

## 2023-01-07 RX ADMIN — HEPARIN SODIUM 5000 UNITS: 5000 INJECTION, SOLUTION INTRAVENOUS; SUBCUTANEOUS at 16:19

## 2023-01-07 RX ADMIN — ACETAMINOPHEN 1000 MG: 500 TABLET ORAL at 20:07

## 2023-01-07 RX ADMIN — CIPROFLOXACIN AND DEXAMETHASONE 4 DROP: 3; 1 SUSPENSION/ DROPS AURICULAR (OTIC) at 17:25

## 2023-01-07 RX ADMIN — HEPARIN SODIUM 5000 UNITS: 5000 INJECTION, SOLUTION INTRAVENOUS; SUBCUTANEOUS at 00:06

## 2023-01-07 RX ADMIN — DOCUSATE SODIUM 50 MG AND SENNOSIDES 8.6 MG 2 TABLET: 8.6; 5 TABLET, FILM COATED ORAL at 17:25

## 2023-01-07 RX ADMIN — SODIUM CHLORIDE, POTASSIUM CHLORIDE, SODIUM LACTATE AND CALCIUM CHLORIDE: 600; 310; 30; 20 INJECTION, SOLUTION INTRAVENOUS at 08:27

## 2023-01-07 RX ADMIN — HEPARIN SODIUM 5000 UNITS: 5000 INJECTION, SOLUTION INTRAVENOUS; SUBCUTANEOUS at 22:58

## 2023-01-07 ASSESSMENT — ENCOUNTER SYMPTOMS
COUGH: 1
NEUROLOGICAL NEGATIVE: 1
CARDIOVASCULAR NEGATIVE: 1
CONSTITUTIONAL NEGATIVE: 1
GASTROINTESTINAL NEGATIVE: 1
SORE THROAT: 1
PSYCHIATRIC NEGATIVE: 1
MUSCULOSKELETAL NEGATIVE: 1
EYES NEGATIVE: 1

## 2023-01-07 ASSESSMENT — PAIN DESCRIPTION - PAIN TYPE
TYPE: ACUTE PAIN
TYPE: ACUTE PAIN

## 2023-01-07 NOTE — PROGRESS NOTES
[facility] Medicine Daily Progress Note    Date of Service  1/7/2023    Chief Complaint  Trini Tirado is a 67 y.o. female admitted 1/6/2023 with ear pain    Hospital Course  67 y.o. female who presented 1/6/2023 with a ear pain.  Patient reports that she has been having headaches and sinus pressure along with dizziness and the sensation of spinning.  She reports her symptoms started last week with ear pain on the right side and decreased hearing.  She reports that she noticed drainage starting yesterday of the right ear.  Denies any problems with her left ear.  She denies fevers, chills, shortness of breath, chest pain, nausea, vomiting.  In the ED CT head demonstrated fluid signal within the mastoid without erosion or subperiosteal abscess.  ENT was consulted.    Interval Problem Update  Patient was evaluated at bedside  Patient no acute distress but reporting right thigh rash  Stable vitals  Saturating well room air  No leukocytosis today  Lactic acidosis trending down  Blood sugars in the lower side, hold insulin sliding scale for now  ENT following, no surgery at this point, continue topical and IV antibiotics  Labs on AM    I have discussed this patient's plan of care and discharge plan at IDT rounds today with Case Management, Nursing, Nursing leadership, and other members of the IDT team.    Consultants/Specialty  ENT    Code Status  Full Code    Disposition  Patient is not medically cleared for discharge.   Anticipate discharge to to home with close outpatient follow-up.  I have placed the appropriate orders for post-discharge needs.    Review of Systems  Review of Systems   Constitutional: Negative.    HENT:  Positive for ear discharge, ear pain, hearing loss and sore throat.    Eyes: Negative.    Respiratory:  Positive for cough.    Cardiovascular: Negative.    Gastrointestinal: Negative.    Genitourinary: Negative.    Musculoskeletal: Negative.    Skin:  Positive for rash.   Neurological: Negative.     Endo/Heme/Allergies: Negative.    Psychiatric/Behavioral: Negative.        Physical Exam  Temp:  [36.1 °C (97 °F)-36.9 °C (98.5 °F)] 36.7 °C (98 °F)  Pulse:  [] 92  Resp:  [15-18] 18  BP: (104-121)/(45-64) 104/48  SpO2:  [93 %-96 %] 96 %    Physical Exam  Constitutional:       Appearance: She is ill-appearing.   HENT:      Head: Normocephalic and atraumatic.      Comments: Bilateral maxillary erythema/rash  Slapped cheek appearance  Eyes:      Extraocular Movements: Extraocular movements intact.      Pupils: Pupils are equal, round, and reactive to light.   Cardiovascular:      Rate and Rhythm: Normal rate and regular rhythm.      Pulses: Normal pulses.      Heart sounds: Normal heart sounds.   Pulmonary:      Effort: Pulmonary effort is normal.      Breath sounds: Normal breath sounds.   Abdominal:      General: Bowel sounds are normal. There is no distension.      Palpations: Abdomen is soft.      Tenderness: There is no abdominal tenderness. There is no guarding or rebound.   Musculoskeletal:         General: No swelling. Normal range of motion.      Cervical back: Normal range of motion and neck supple.   Skin:     General: Skin is warm.      Coloration: Skin is not jaundiced.      Findings: Rash present.   Neurological:      General: No focal deficit present.      Mental Status: She is alert and oriented to person, place, and time. Mental status is at baseline.      Cranial Nerves: No cranial nerve deficit.   Psychiatric:         Mood and Affect: Mood normal.         Behavior: Behavior normal.         Thought Content: Thought content normal.         Judgment: Judgment normal.       Fluids    Intake/Output Summary (Last 24 hours) at 1/7/2023 1329  Last data filed at 1/7/2023 0900  Gross per 24 hour   Intake 240 ml   Output --   Net 240 ml       Laboratory  Recent Labs     01/06/23  1115 01/07/23  0848   WBC 15.1* 11.4*   RBC 3.92* 3.77*   HEMOGLOBIN 11.7* 11.2*   HEMATOCRIT 35.6* 34.2*   MCV 90.8 90.7    MCH 29.8 29.7   MCHC 32.9* 32.7*   RDW 61.1* 63.0*   PLATELETCT 105* 110*   MPV 10.8 10.4     Recent Labs     01/06/23  1115 01/07/23  0848   SODIUM 136 138   POTASSIUM 3.2* 4.1   CHLORIDE 102 106   CO2 19* 22   GLUCOSE 119* 65   BUN 18 20   CREATININE 1.28 1.31   CALCIUM 8.6 8.3*                   Imaging  CT-RENAL COLIC EVALUATION(A/P W/O)   Final Result      1.  Small right pleural effusion.      2.  Small amount of ascites in the abdomen.      3.  Hepatosplenomegaly with cirrhotic changes of the liver.      4.  Multiple small nonobstructing right-sided renal calculi.      5.  No evidence of hydronephrosis or ureteral obstruction.      US-RUQ   Final Result      1.  Features of chronic liver disease with stigmata of portal hypertension.   2.  Ascites.   3.  Portal vein thrombus cannot be definitively excluded given lack of color Doppler flow, though this could be related to slow flow related to portal hypertension.      CT-HEAD W/O   Final Result      1.  Opacification of the right mastoid air cells and middle ear consistent with reported symptoms of otomastoiditis. No evidence to suggest erosive otomastoiditis. The ossicles appear intact.   2.  No acute intracranial abnormality.   3.  Mild white matter changes, likely microvascular ischemic changes.         DX-CHEST-PORTABLE (1 VIEW)   Final Result      1.  No acute cardiac or pulmonary abnormalities are identified.           Assessment/Plan  * Spontaneous rupture of tympanic membrane of right ear concurrent with and due to acute suppurative otitis media- (present on admission)  Assessment & Plan  Confirmed by ENT at ED  Causing sepsis  Continue quinolone drops  Continue IV cefepime  ENT following, appreciate recommendations  Labs on a.m.    Thrombocytopenia (HCC)- (present on admission)  Assessment & Plan  Plt 108  No active bleeding  monitor    Normochromic normocytic anemia- (present on admission)  Assessment & Plan  hgb 11.7  No active  bleeding.    Hypokalemia- (present on admission)  Assessment & Plan  Replete as necessary    Normal anion gap metabolic acidosis- (present on admission)  Assessment & Plan  Resolved    Elevated serum creatinine- (present on admission)  Assessment & Plan  Creatinine trending down slowly  Labs on a.m.    Class 2 severe obesity due to excess calories with serious comorbidity and body mass index (BMI) of 37.0 to 37.9 in adult (HCC)- (present on admission)  Assessment & Plan  Diet exercise and weight loss    Type II diabetes mellitus (HCC)- (present on admission)  Assessment & Plan  a1c 6.9  ISS  DM diet    Sepsis (HCC)- (present on admission)  Assessment & Plan  This is Sepsis Present on admission  SIRS criteria identified on my evaluation include: Tachycardia, with heart rate greater than 90 BPM and Leukocytosis, with WBC greater than 12,000  Source is otomastoiditis  Sepsis protocol initiated  Fluid resuscitation ordered per protocol  Crystalloid Fluid Administration: Fluid resuscitation ordered per standard protocol - 30 mL/kg per current or ideal body weight  IV antibiotics as appropriate for source of sepsis  Reassessment: I have reassessed the patient's hemodynamic status  Cefepime  Resolved       VTE prophylaxis: heparin ppx    I have performed a physical exam and reviewed and updated ROS and Plan today (1/7/2023). In review of yesterday's note (1/6/2023), there are no changes except as documented above.

## 2023-01-07 NOTE — CARE PLAN
The patient is Stable - Low risk of patient condition declining or worsening    Shift Goals  Clinical Goals: pain control  Patient Goals: pain control  Family Goals: helena    Progress made toward(s) clinical / shift goals:  pt states she understands POC      Problem: Knowledge Deficit - Standard  Goal: Patient and family/care givers will demonstrate understanding of plan of care, disease process/condition, diagnostic tests and medications  Outcome: Progressing

## 2023-01-07 NOTE — PROGRESS NOTES
Hypoglycemia Intervention    Hypoglycemia protocol intervention:  Blood glucose of 69 at 0820.  Intervention: 8 oz of fruit juice   Repeat blood glucose of 63 at 0840.  Intervention: 8 oz of fruit juice   Repeat blood glucose of 78 at 0900  No additional interventions needed.  Dr. Kati Plummer notified of the above at 0850.

## 2023-01-07 NOTE — PROGRESS NOTES
4 Eyes Skin Assessment Completed by ANNITA Lsasiter and ANNITA Jacobo.    Head WDL  Ears WDL  Nose WDL  Mouth WDL  Neck WDL  Breast/Chest R breast redness, L breast WDL  Shoulder Blades WDL  Spine WDL  (R) Arm/Elbow/Hand Bruising  (L) Arm/Elbow/Hand Bruising and Scab  Abdomen WDL  Groin Redness, Excoriation, and Rash  Scrotum/Coccyx/Buttocks Redness, Blanching, and Excoriation  (R) Leg Bruising  (L) Leg Bruising  (R) Heel/Foot/Toe WDL  (L) Heel/Foot/Toe WDL          Devices In Places N/A      Interventions In Place Heel Mepilex, Pillows, and Barrier Cream    Possible Skin Injury Yes    Pictures Uploaded Into Epic Yes  Wound Consult Placed N/A  RN Wound Prevention Protocol Ordered Yes

## 2023-01-07 NOTE — PROGRESS NOTES
Received report from ED RN, assumed care of patient. Patient is A&Ox4, vital signs are stable, on room air. Patient reports right ear pain at this time, nmedicated per MAR. Sitting up in bed for dinner. Call light and belongings within reach. Bed in lowest/locked position. Hourly rounding in place.

## 2023-01-07 NOTE — ASSESSMENT & PLAN NOTE
Confirmed by ENT at ED  Causing sepsis  Continue quinolone drops  Continue IV cefepime  ENT following, appreciate recommendations  Labs on a.m.

## 2023-01-07 NOTE — PROGRESS NOTES
Assumed care of pt. A&ox4. Pt educated on POC. Bed locked and in the lowest position, call light within reach, all needs met at this time   

## 2023-01-07 NOTE — PROGRESS NOTES
HD 2: Right AOM with mastoid involvement and UTI    24 hr events:  Admitted, started on IV abx, ear drops    S: Feeling better, less pain, feeling unbalanced without vertigo, ear continues to drain, getting drops.    AF, VSS  Alert, NAD  Ongoing bilateral malar flushing  Normal neck ROM with mild stiffness, no meningmus  Right EAC with drainage, unable to visualize TM perforation  No mastoid TTP or fluctuance, no parotid or SCM fluctuance    WBC:  11.4 ( 15.1)    A/P: HD 2 AOM with TM rupture and mastoid involvement, no evidence of acute coalescent mastoiditis or meningitis  -No indication for surgery  -Continue drops and IV abx  -ENT to follow

## 2023-01-07 NOTE — CARE PLAN
The patient is Stable - Low risk of patient condition declining or worsening    Shift Goals  Clinical Goals: pain control  Patient Goals: pain control  Family Goals: helena    Progress made toward(s) clinical / shift goals:  pt states she understands POC      Problem: Knowledge Deficit - Standard  Goal: Patient and family/care givers will demonstrate understanding of plan of care, disease process/condition, diagnostic tests and medications  1/6/2023 2316 by Kathrin Rodriguez R.N.  Outcome: Progressing  1/6/2023 1859 by Kathrin Rodriguez R.N.  Outcome: Progressing

## 2023-01-07 NOTE — PROGRESS NOTES
Received report from previous shift RN, assumed care of patient. Patient is A&Ox4, vital signs are stable, on room air. Patient denies pain at this time, no signs of distress or discomfort. Sitting up in bed for breakfast. Call light and belongings within reach. Bed in lowest/locked position. Bed alarm on. Hourly rounding in place.

## 2023-01-07 NOTE — H&P
Surgery Otolaryngology History & Physical Note    Date  1/6/2023    Primary Care Physician  Pcp Pt States None    CC  Ear pain and drainage    HPI  This is a 67 y.o. female who presented with ear pain. She has had some headache and sinus pressure along with a sensation of dizziness she describes as spinning.  For the last week she has had ear pain on the right side and decreased hearing.  No recent spinning sensation.  She came into the ED and then began to have drainage from her right ear.  She is having pain in the ear and the neck with movement.  She has not had ear problems recently.  No history of ear surgery.  No problems with the left ear.    She was also noted to have a UTI, elevated white count, and elevated lactate.  She was admitted to the hospitalist service.  CT head demonstrated fluid signal within the mastoid without erosion or subperiosteal abscess.  Skin was read as consistent with otomastoiditis ENT consultation was requested.    History reviewed. No pertinent past medical history.    History reviewed. No pertinent surgical history.    Current Facility-Administered Medications   Medication Dose Route Frequency Provider Last Rate Last Admin    lactated ringers infusion   Intravenous Continuous Salas France D.O. 100 mL/hr at 01/06/23 1559 New Bag at 01/06/23 1559    senna-docusate (PERICOLACE or SENOKOT S) 8.6-50 MG per tablet 2 Tablet  2 Tablet Oral BID Salas France D.O.   2 Tablet at 01/06/23 1817    And    polyethylene glycol/lytes (MIRALAX) PACKET 1 Packet  1 Packet Oral QDAY PRN Salas France D.O.        And    magnesium hydroxide (MILK OF MAGNESIA) suspension 30 mL  30 mL Oral QDAY PRN Salas France D.O.        And    bisacodyl (DULCOLAX) suppository 10 mg  10 mg Rectal QDAY PRN Salas France D.O.        heparin injection 5,000 Units  5,000 Units Subcutaneous Q8HRS Salas France D.O.   5,000 Units at 01/06/23 1815    insulin regular (HumuLIN R,NovoLIN R) injection  1-6 Units  Subcutaneous 4X/DAY ACHS Salas France D.O.        And    dextrose 10 % BOLUS 25 g  25 g Intravenous Q15 MIN PRN Salas France D.O.        acetaminophen (Tylenol) tablet 650 mg  650 mg Oral Q6HRS PRN Salas France D.O.   650 mg at 01/06/23 1838    labetalol (NORMODYNE/TRANDATE) injection 10 mg  10 mg Intravenous Q4HRS PRN Salas France D.O.        ondansetron (ZOFRAN) syringe/vial injection 4 mg  4 mg Intravenous Q4HRS PRN PANKAJ CarrizalesOYeni        ondansetron (ZOFRAN ODT) dispertab 4 mg  4 mg Oral Q4HRS PRN Salas France D.O.        ciprofloxacin/dexamethasone (CIPRODEX) 0.3-0.1 % otic suspension 4 Drop  4 Drop Right Ear BID Alicia Downs M.D.   4 Drop at 01/06/23 1817       Social History     Socioeconomic History    Marital status:      Spouse name: Not on file    Number of children: Not on file    Years of education: Not on file    Highest education level: Not on file   Occupational History    Not on file   Tobacco Use    Smoking status: Never    Smokeless tobacco: Never   Vaping Use    Vaping Use: Never used   Substance and Sexual Activity    Alcohol use: Not Currently    Drug use: Never    Sexual activity: Not on file   Other Topics Concern    Not on file   Social History Narrative    Not on file     Social Determinants of Health     Financial Resource Strain: Not on file   Food Insecurity: Not on file   Transportation Needs: Not on file   Physical Activity: Not on file   Stress: Not on file   Social Connections: Not on file   Intimate Partner Violence: Not on file   Housing Stability: Not on file       History reviewed. No pertinent family history.    Allergies  Penicillins    Review of Systems  Negative except for as per HPI    Physical Exam  Vitals reviewed.   Constitutional:       Appearance: Normal appearance.   HENT:      Head: Normocephalic and atraumatic.      Ears:      Comments: Mucoid and purulent appearing drainage within the EAC.  I could not visualize the tympanic  membrane     Nose: Nose normal.      Mouth/Throat:      Mouth: Mucous membranes are dry.      Pharynx: Oropharynx is clear.   Musculoskeletal:      Cervical back: Normal range of motion. No rigidity.   Lymphadenopathy:      Cervical: No cervical adenopathy.   Neurological:      Mental Status: She is alert.      Cranial Nerves: No cranial nerve deficit.       Vital Signs  Blood Pressure : 107/45   Temperature: 36.1 °C (97 °F)   Pulse: 98   Respiration: 18   Pulse Oximetry: 96 %       Labs:  Recent Labs     01/06/23  1115   WBC 15.1*   RBC 3.92*   HEMOGLOBIN 11.7*   HEMATOCRIT 35.6*   MCV 90.8   MCH 29.8   MCHC 32.9*   RDW 61.1*   PLATELETCT 105*   MPV 10.8     Recent Labs     01/06/23  1115   SODIUM 136   POTASSIUM 3.2*   CHLORIDE 102   CO2 19*   GLUCOSE 119*   BUN 18   CREATININE 1.28   CALCIUM 8.6         Recent Labs     01/06/23  1115   ASTSGOT 108*   ALTSGPT 35   TBILIRUBIN 2.4*   ALKPHOSPHAT 237*   GLOBULIN 3.2       Radiology:  CT-RENAL COLIC EVALUATION(A/P W/O)   Final Result      1.  Small right pleural effusion.      2.  Small amount of ascites in the abdomen.      3.  Hepatosplenomegaly with cirrhotic changes of the liver.      4.  Multiple small nonobstructing right-sided renal calculi.      5.  No evidence of hydronephrosis or ureteral obstruction.      US-RUQ   Final Result      1.  Features of chronic liver disease with stigmata of portal hypertension.   2.  Ascites.   3.  Portal vein thrombus cannot be definitively excluded given lack of color Doppler flow, though this could be related to slow flow related to portal hypertension.      CT-HEAD W/O   Final Result      1.  Opacification of the right mastoid air cells and middle ear consistent with reported symptoms of otomastoiditis. No evidence to suggest erosive otomastoiditis. The ossicles appear intact.   2.  No acute intracranial abnormality.   3.  Mild white matter changes, likely microvascular ischemic changes.         DX-CHEST-PORTABLE (1 VIEW)    Final Result      1.  No acute cardiac or pulmonary abnormalities are identified.            Assessment/Plan:  Symptoms are consistent with acute otitis media with tympanic membrane rupture.  There is fluid signal within the mastoid but no findings consistent with coalescent mastoiditis.  Since she has had rupture of the tympanic membrane I recommend the addition of ototopical drops.  I suspect symptoms will resolve after a couple of days of eardrops and IV antibiotics.  There is a low risk that she will need surgery.

## 2023-01-08 ENCOUNTER — PHARMACY VISIT (OUTPATIENT)
Dept: PHARMACY | Facility: MEDICAL CENTER | Age: 68
End: 2023-01-08
Payer: MEDICARE

## 2023-01-08 VITALS
OXYGEN SATURATION: 93 % | RESPIRATION RATE: 17 BRPM | HEART RATE: 105 BPM | WEIGHT: 192.46 LBS | DIASTOLIC BLOOD PRESSURE: 57 MMHG | BODY MASS INDEX: 37.79 KG/M2 | HEIGHT: 60 IN | SYSTOLIC BLOOD PRESSURE: 112 MMHG | TEMPERATURE: 97.5 F

## 2023-01-08 PROBLEM — A41.9 SEPSIS (HCC): Status: RESOLVED | Noted: 2023-01-06 | Resolved: 2023-01-08

## 2023-01-08 PROBLEM — E87.6 HYPOKALEMIA: Status: RESOLVED | Noted: 2023-01-06 | Resolved: 2023-01-08

## 2023-01-08 PROBLEM — E87.20 NORMAL ANION GAP METABOLIC ACIDOSIS: Status: RESOLVED | Noted: 2023-01-06 | Resolved: 2023-01-08

## 2023-01-08 LAB
ALBUMIN SERPL BCP-MCNC: 2.6 G/DL (ref 3.2–4.9)
ALBUMIN/GLOB SERPL: 0.8 G/DL
ALP SERPL-CCNC: 238 U/L (ref 30–99)
ALT SERPL-CCNC: 31 U/L (ref 2–50)
ANION GAP SERPL CALC-SCNC: 10 MMOL/L (ref 7–16)
AST SERPL-CCNC: 90 U/L (ref 12–45)
BACTERIA WND AEROBE CULT: ABNORMAL
BACTERIA WND AEROBE CULT: ABNORMAL
BASOPHILS # BLD AUTO: 0.6 % (ref 0–1.8)
BASOPHILS # BLD: 0.05 K/UL (ref 0–0.12)
BILIRUB SERPL-MCNC: 2.5 MG/DL (ref 0.1–1.5)
BUN SERPL-MCNC: 19 MG/DL (ref 8–22)
CALCIUM ALBUM COR SERPL-MCNC: 9.7 MG/DL (ref 8.5–10.5)
CALCIUM SERPL-MCNC: 8.6 MG/DL (ref 8.5–10.5)
CHLORIDE SERPL-SCNC: 104 MMOL/L (ref 96–112)
CO2 SERPL-SCNC: 22 MMOL/L (ref 20–33)
CREAT SERPL-MCNC: 1.12 MG/DL (ref 0.5–1.4)
EOSINOPHIL # BLD AUTO: 0.24 K/UL (ref 0–0.51)
EOSINOPHIL NFR BLD: 2.6 % (ref 0–6.9)
ERYTHROCYTE [DISTWIDTH] IN BLOOD BY AUTOMATED COUNT: 62.3 FL (ref 35.9–50)
GFR SERPLBLD CREATININE-BSD FMLA CKD-EPI: 54 ML/MIN/1.73 M 2
GLOBULIN SER CALC-MCNC: 3.4 G/DL (ref 1.9–3.5)
GLUCOSE SERPL-MCNC: 96 MG/DL (ref 65–99)
GRAM STN SPEC: ABNORMAL
HCT VFR BLD AUTO: 35.7 % (ref 37–47)
HGB BLD-MCNC: 11.5 G/DL (ref 12–16)
IMM GRANULOCYTES # BLD AUTO: 0.04 K/UL (ref 0–0.11)
IMM GRANULOCYTES NFR BLD AUTO: 0.4 % (ref 0–0.9)
LYMPHOCYTES # BLD AUTO: 1.15 K/UL (ref 1–4.8)
LYMPHOCYTES NFR BLD: 12.7 % (ref 22–41)
MCH RBC QN AUTO: 29.3 PG (ref 27–33)
MCHC RBC AUTO-ENTMCNC: 32.2 G/DL (ref 33.6–35)
MCV RBC AUTO: 91.1 FL (ref 81.4–97.8)
MONOCYTES # BLD AUTO: 0.58 K/UL (ref 0–0.85)
MONOCYTES NFR BLD AUTO: 6.4 % (ref 0–13.4)
NEUTROPHILS # BLD AUTO: 7.03 K/UL (ref 2–7.15)
NEUTROPHILS NFR BLD: 77.3 % (ref 44–72)
NRBC # BLD AUTO: 0 K/UL
NRBC BLD-RTO: 0 /100 WBC
PLATELET # BLD AUTO: 124 K/UL (ref 164–446)
PMV BLD AUTO: 11.2 FL (ref 9–12.9)
POTASSIUM SERPL-SCNC: 4.1 MMOL/L (ref 3.6–5.5)
PROT SERPL-MCNC: 6 G/DL (ref 6–8.2)
RBC # BLD AUTO: 3.92 M/UL (ref 4.2–5.4)
SIGNIFICANT IND 70042: ABNORMAL
SITE SITE: ABNORMAL
SODIUM SERPL-SCNC: 136 MMOL/L (ref 135–145)
SOURCE SOURCE: ABNORMAL
WBC # BLD AUTO: 9.1 K/UL (ref 4.8–10.8)

## 2023-01-08 PROCEDURE — 700102 HCHG RX REV CODE 250 W/ 637 OVERRIDE(OP): Performed by: STUDENT IN AN ORGANIZED HEALTH CARE EDUCATION/TRAINING PROGRAM

## 2023-01-08 PROCEDURE — A9270 NON-COVERED ITEM OR SERVICE: HCPCS | Performed by: STUDENT IN AN ORGANIZED HEALTH CARE EDUCATION/TRAINING PROGRAM

## 2023-01-08 PROCEDURE — 80053 COMPREHEN METABOLIC PANEL: CPT

## 2023-01-08 PROCEDURE — 85025 COMPLETE CBC W/AUTO DIFF WBC: CPT

## 2023-01-08 PROCEDURE — RXMED WILLOW AMBULATORY MEDICATION CHARGE: Performed by: INTERNAL MEDICINE

## 2023-01-08 PROCEDURE — 700105 HCHG RX REV CODE 258: Performed by: INTERNAL MEDICINE

## 2023-01-08 PROCEDURE — 36415 COLL VENOUS BLD VENIPUNCTURE: CPT

## 2023-01-08 PROCEDURE — 700111 HCHG RX REV CODE 636 W/ 250 OVERRIDE (IP): Performed by: STUDENT IN AN ORGANIZED HEALTH CARE EDUCATION/TRAINING PROGRAM

## 2023-01-08 PROCEDURE — 99239 HOSP IP/OBS DSCHRG MGMT >30: CPT | Performed by: INTERNAL MEDICINE

## 2023-01-08 PROCEDURE — 700105 HCHG RX REV CODE 258: Performed by: STUDENT IN AN ORGANIZED HEALTH CARE EDUCATION/TRAINING PROGRAM

## 2023-01-08 RX ORDER — CEFDINIR 300 MG/1
300 CAPSULE ORAL EVERY 12 HOURS
Status: DISCONTINUED | OUTPATIENT
Start: 2023-01-08 | End: 2023-01-08 | Stop reason: HOSPADM

## 2023-01-08 RX ORDER — NEOMYCIN SULFATE, POLYMYXIN B SULFATE AND HYDROCORTISONE 10; 3.5; 1 MG/ML; MG/ML; [USP'U]/ML
5 SUSPENSION/ DROPS AURICULAR (OTIC) 3 TIMES DAILY
Status: DISCONTINUED | OUTPATIENT
Start: 2023-01-08 | End: 2023-01-08

## 2023-01-08 RX ORDER — CEFDINIR 300 MG/1
300 CAPSULE ORAL EVERY 12 HOURS
Qty: 18 CAPSULE | Refills: 0 | Status: SHIPPED | OUTPATIENT
Start: 2023-01-08 | End: 2023-01-17

## 2023-01-08 RX ORDER — NEOMYCIN SULFATE, POLYMYXIN B SULFATE AND HYDROCORTISONE 10; 3.5; 1 MG/ML; MG/ML; [USP'U]/ML
5 SUSPENSION/ DROPS AURICULAR (OTIC) 3 TIMES DAILY
Status: DISCONTINUED | OUTPATIENT
Start: 2023-01-08 | End: 2023-01-08 | Stop reason: HOSPADM

## 2023-01-08 RX ORDER — SODIUM CHLORIDE 9 MG/ML
500 INJECTION, SOLUTION INTRAVENOUS ONCE
Status: COMPLETED | OUTPATIENT
Start: 2023-01-08 | End: 2023-01-08

## 2023-01-08 RX ORDER — NEOMYCIN SULFATE, POLYMYXIN B SULFATE AND HYDROCORTISONE 10; 3.5; 1 MG/ML; MG/ML; [USP'U]/ML
5 SUSPENSION/ DROPS AURICULAR (OTIC) 3 TIMES DAILY
Qty: 10 ML | Refills: 0 | Status: SHIPPED | OUTPATIENT
Start: 2023-01-08 | End: 2023-01-15

## 2023-01-08 RX ADMIN — ACETAMINOPHEN 1000 MG: 500 TABLET ORAL at 08:46

## 2023-01-08 RX ADMIN — CIPROFLOXACIN AND DEXAMETHASONE 4 DROP: 3; 1 SUSPENSION/ DROPS AURICULAR (OTIC) at 04:15

## 2023-01-08 RX ADMIN — NYSTATIN: 100000 POWDER TOPICAL at 04:15

## 2023-01-08 RX ADMIN — HEPARIN SODIUM 5000 UNITS: 5000 INJECTION, SOLUTION INTRAVENOUS; SUBCUTANEOUS at 08:46

## 2023-01-08 RX ADMIN — CEFEPIME 2 G: 2 INJECTION, POWDER, FOR SOLUTION INTRAVENOUS at 04:16

## 2023-01-08 RX ADMIN — SODIUM CHLORIDE 500 ML: 9 INJECTION, SOLUTION INTRAVENOUS at 11:22

## 2023-01-08 RX ADMIN — DOCUSATE SODIUM 50 MG AND SENNOSIDES 8.6 MG 2 TABLET: 8.6; 5 TABLET, FILM COATED ORAL at 04:15

## 2023-01-08 RX ADMIN — OXYCODONE HYDROCHLORIDE 5 MG: 5 TABLET ORAL at 05:41

## 2023-01-08 NOTE — CARE PLAN
The patient is Stable - Low risk of patient condition declining or worsening    Shift Goals  Clinical Goals: abx, pain control  Patient Goals: pain control  Family Goals: helena    Progress made toward(s) clinical / shift goals:  pt states her pain is under control at this time      Problem: Pain - Standard  Goal: Alleviation of pain or a reduction in pain to the patient’s comfort goal  Outcome: Progressing

## 2023-01-08 NOTE — PROGRESS NOTES
HD 3: Right AOM with mastoid involvement and UTI    24 hr events:  Still having ear pain, still having neck pain, right ear continues to drain, left ear draining now as well    S: Feeling better, less pain, feeling unbalanced without vertigo, ear continues to drain, getting drops.    AF, VSS  Alert, NAD  Improved bilateral malar flushing  Right mastoid TTP without erythema or fluctuance  No parotid or SCM fluctuance    WBC:  No new labs    GS: Beta hemolytic GAS    A/P: HD 3 AOM with TM rupture and mastoid involvement, no evidence of acute coalescent mastoiditis or meningitis  -No indication for surgery  -Drops switched to Cortisporin and Rx'd bilaterally  -Continue IV/PO abx  -If WBC improved, OK to DC home

## 2023-01-08 NOTE — CARE PLAN
The patient is Stable - Low risk of patient condition declining or worsening    Shift Goals  Clinical Goals: IV abx, pain management  Patient Goals: Pain control  Family Goals: helena    Progress made toward(s) clinical / shift goals: Pain managed with PRN pain medications throughout shift.       Problem: Pain - Standard  Goal: Alleviation of pain or a reduction in pain to the patient’s comfort goal  Outcome: Progressing       Patient is not progressing towards the following goals:

## 2023-01-08 NOTE — PROGRESS NOTES
"Assumed care of pt. A&ox4, pt states her \"ear is currently feeling better\". Pt educated on POC. Bed locked and in the lowest position, call light within reach, all needs met at this time.  "

## 2023-01-08 NOTE — DISCHARGE SUMMARY
Discharge Summary    CHIEF COMPLAINT ON ADMISSION  Chief Complaint   Patient presents with    Earache     Right earache, believes the eardrum ruptured last night. Sinus pressure x 1 month    Flu Like Symptoms     Seen at [facility] for same, told she had fluid in her lungs and that it was probably bacterial. Sent for blood work. Not prescribed antibiotics. Prescribed a face cream for facial redness. Unable to fill because medication cost $300.     Leg Swelling     Saw cardiologist Dr. Andrea In November.     Fall     Falls x the last year, using a walker. Referral to neurology from November.        Reason for Admission   HD 2 AOM with TM rupture and mastoid involvement\  Spontaneous rupture of tympanic membranes of the right ear.    Admission Date  1/6/2023    CODE STATUS  Full Code    HPI & HOSPITAL COURSE    67-year-old female with history of diabetes and hypothyroidism who presented 1/6/2023 with a ear pain.  Patient reports that she has been having headaches and sinus pressure along with dizziness and the sensation of spinning.  She reports her symptoms started a week ago with ear pain on the right side and decreased hearing.  She reports that she noticed drainage starting a day before admission of the right ear.  Denies any problems with her left ear.  She denies fevers, chills, shortness of breath, chest pain, nausea, vomiting.  In the ED CT head demonstrated fluid signal within the mastoid without erosion or subperiosteal abscess.  Patient was dehydrated with creatinine 1.2, lactic acid 4.7 and patient had leukocytosis,  ENT was consulted.  And start with IV antibiotics cefepime for sepsis and otitis.  ENT Dr. Alicia Downs monitored her for 2 days, no need for surgical intervention, continue oral antibiotics with eardrops Cortisporin bilateral.  Fluid culture showed beta-hemolytic Streptococcus group A, after treating 2 days with IV antibiotics cefepime.  Patient feels better, no significant headache,  no fever and no leukocytosis, no signs of meningitis, patient cleared by ENT for discharge to continue cefdinir with close monitoring with ENT clinic.    Patient has a history of diabetes and chronic kidney disease her creatinine around 1.2 and 1.3, encouraged the patient to control her blood sugar, hydration and follow-up with PCP to repeat labs.    TSH was 7.7, patient is taking 75 mcg of levothyroxine daily, follow-up with PCP to repeat labs.    On the day of discharge the patient is alert oriented x4, denied any fever or chills, no leukocytosis, some drainage on the right ear, patient cleared by ENT for discharge no intervention.    Therefore, she is discharged in good and stable condition to home with close outpatient follow-up.    The patient met 2-midnight criteria for an inpatient stay at the time of discharge.    Discharge Date  01/08/23      FOLLOW UP ITEMS POST DISCHARGE  Follow-up with PCP for comorbidities diabetes and chronic kidney disease  Follow-up with ENT    DISCHARGE DIAGNOSES  Principal Problem:    Spontaneous rupture of tympanic membrane of right ear concurrent with and due to acute suppurative otitis media POA: Yes  Active Problems:    Type II diabetes mellitus (HCC) POA: Yes    Class 2 severe obesity due to excess calories with serious comorbidity and body mass index (BMI) of 37.0 to 37.9 in adult (HCC) POA: Yes    Elevated serum creatinine POA: Yes    Normochromic normocytic anemia POA: Yes    Thrombocytopenia (HCC) POA: Yes  Resolved Problems:    Sepsis (HCC) POA: Yes    Normal anion gap metabolic acidosis POA: Yes    Hypokalemia POA: Yes      FOLLOW UP  Alicia Downs M.D.  [address] [facility]  [place] [id number] [phone]    Follow up in 1 week(s)        MEDICATIONS ON DISCHARGE     Medication List        START taking these medications        Instructions   cefdinir 300 MG Caps  Commonly known as: OMNICEF   Take 1 Capsule by mouth every 12 hours for 9 days.  Dose: 300 mg      neomycin-polymyxin-HC 3.5-73801-6 Susp  Commonly known as: PEDIOTIC HC   Administer 5 Drops into the right ear 3 times a day for 7 days.  Dose: 5 Drop            CONTINUE taking these medications        Instructions   gabapentin 100 MG Caps  Commonly known as: NEURONTIN   Take 100 mg by mouth 2 times a day as needed (Leg spasms).  Dose: 100 mg     HYDROcodone-acetaminophen 5-325 MG Tabs per tablet  Commonly known as: NORCO   Take 1 Tablet by mouth 2 times a day as needed (Pain).  Dose: 1 Tablet     levothyroxine 75 MCG Tabs  Commonly known as: SYNTHROID   Take 75 mcg by mouth every morning on an empty stomach.  Dose: 75 mcg     lisinopril 5 MG Tabs  Commonly known as: PRINIVIL   Take 5 mg by mouth every day.  Dose: 5 mg     metoprolol  MG Tb24  Commonly known as: TOPROL XL   Take 100 mg by mouth every day.  Dose: 100 mg     metronidazole 0.75 % gel  Commonly known as: METROGEL   Apply 1 Application topically 2 times a day.  Dose: 1 Application     rosuvastatin 10 MG Tabs  Commonly known as: CRESTOR   Take 10 mg by mouth every evening.  Dose: 10 mg     sertraline 100 MG Tabs  Commonly known as: Zoloft   Take 100 mg by mouth every day.  Dose: 100 mg              Allergies  Allergies   Allergen Reactions    Penicillins Rash     Rash       DIET  Orders Placed This Encounter   Procedures    Diet Order Diet: Consistent CHO (Diabetic)     Standing Status:   Standing     Number of Occurrences:   1     Order Specific Question:   Diet:     Answer:   Consistent CHO (Diabetic) [4]       ACTIVITY  As tolerated.  Weight bearing as tolerated    CONSULTATIONS  ENT    PROCEDURES  None    LABORATORY  Lab Results   Component Value Date    SODIUM 138 01/07/2023    POTASSIUM 4.1 01/07/2023    CHLORIDE 106 01/07/2023    CO2 22 01/07/2023    GLUCOSE 65 01/07/2023    BUN 20 01/07/2023    CREATININE 1.31 01/07/2023    GLOMRATE 50 (L) 12/09/2022        Lab Results   Component Value Date    WBC 9.1 01/08/2023    HEMOGLOBIN 11.5 (L)  01/08/2023    HEMATOCRIT 35.7 (L) 01/08/2023    PLATELETCT 124 (L) 01/08/2023        Total time of the discharge process exceeds 35 minutes.

## 2023-01-09 LAB — PTH-INTACT SERPL-MCNC: 39.4 PG/ML (ref 14–72)

## 2023-01-11 LAB
BACTERIA BLD CULT: NORMAL
BACTERIA BLD CULT: NORMAL
SIGNIFICANT IND 70042: NORMAL
SIGNIFICANT IND 70042: NORMAL
SITE SITE: NORMAL
SITE SITE: NORMAL
SOURCE SOURCE: NORMAL
SOURCE SOURCE: NORMAL

## 2023-01-12 LAB
ANNOTATION COMMENT IMP: NOT DETECTED
B19V DNA SERPL NAA+PROBE-ACNC: <100 IU/ML
B19V DNA SERPL NAA+PROBE-LOG IU: <2 LOG IU/ML
SPECIMEN SOURCE: NORMAL

## 2023-01-17 NOTE — DOCUMENTATION QUERY
"                                                                         Formerly [facility]                                                                       Query Response Note      PATIENT:               CAROL WHITTEN  ACCT #:                  [id number]  MRN:                     [id number]  :                      1955  ADMIT DATE:       2023 10:05 AM  DISCH DATE:        2023 2:32 PM  RESPONDING  PROVIDER #:        [id number]           QUERY TEXT:    The diagnosis of Sepsis has been documented in the medical record. Patient admitted with acute otitis media and UA suggestive of UTI. Patient met 2/4 SIRS criteria on admission with leukocytosis (WBC 15.1) and tachycardia (HR greater than 90).     Leukocytosis resolved within 24 hours of initiating IV abx. Patient discharged home on PO abx after a LOS of 2 days.     Please clarify the status of Sepsis after further review.      The patient's Clinical Indicators include:  66 yo F admitted with acute suppurative otitis media with spontaneous rupture of tympanic membrane    Clinical Indicators: Vitals on admission: Afebrile (37.0), HR , RR 15-20  Labs: WBC 15.1-->11.4-->9.1; lactic acid 4.7-->2.9-->2.3     ED note: \"At this time patient needs to be hospitalized for UTI, mastoiditis, right otitis media,  sepsis and generalized weakness. \"   H&P: \"Normal appearance.\" \"symptoms started last week with  ear pain on the right side and decreased hearing.\"      DCS: \"Fluid culture showed  beta-hemolytic Streptococcus group A, after treating 2 days with IV antibiotics cefepime.  Patient feels better, no significant headache, no fever and no leukocytosis\" ; \" patient cleared by ENT for discharge to continue cefdinir\"    Risk Factors: Age, type 2 DM, Otitis media with ear pain, dehydration    Treatments: IV fluids, IV abx -->transitioned to PO abx at discharge, monitoring labs, ENT consult      Contact me with any questions.    Thank you for your time and " Radha ernandez RN, [facility]  [email or web address]  Connect via email, Voalte or messenger.  Options provided:   -- Sepsis exists   -- Sepsis suspected on admission, ruled out after further study   -- Other explanation, (please specify other explanation)   -- Unable to provide additional clarity regarding the diagnosis      Query created by: Radha Che on 1/17/2023 12:11 PM    RESPONSE TEXT:    Sepsis exists          Electronically signed by:  LUIGI POLLOCK MD 1/17/2023 12:16 PM